# Patient Record
Sex: MALE | Race: OTHER | HISPANIC OR LATINO | URBAN - METROPOLITAN AREA
[De-identification: names, ages, dates, MRNs, and addresses within clinical notes are randomized per-mention and may not be internally consistent; named-entity substitution may affect disease eponyms.]

---

## 2020-10-31 ENCOUNTER — INPATIENT (INPATIENT)
Facility: HOSPITAL | Age: 40
LOS: 11 days | Discharge: HOME | DRG: 815 | End: 2020-11-12
Attending: HOSPITALIST | Admitting: INTERNAL MEDICINE
Payer: MEDICAID

## 2020-10-31 VITALS
RESPIRATION RATE: 18 BRPM | HEART RATE: 94 BPM | DIASTOLIC BLOOD PRESSURE: 91 MMHG | OXYGEN SATURATION: 100 % | SYSTOLIC BLOOD PRESSURE: 152 MMHG | TEMPERATURE: 96 F

## 2020-10-31 DIAGNOSIS — T68.XXXA HYPOTHERMIA, INITIAL ENCOUNTER: ICD-10-CM

## 2020-10-31 LAB
ADD ON TEST-SPECIMEN IN LAB: SIGNIFICANT CHANGE UP
ALBUMIN SERPL ELPH-MCNC: 2.8 G/DL — LOW (ref 3.3–5)
ALBUMIN SERPL ELPH-MCNC: 2.9 G/DL — LOW (ref 3.3–5)
ALBUMIN SERPL ELPH-MCNC: 3.5 G/DL — SIGNIFICANT CHANGE UP (ref 3.3–5)
ALP SERPL-CCNC: 136 U/L — HIGH (ref 40–120)
ALP SERPL-CCNC: 144 U/L — HIGH (ref 40–120)
ALP SERPL-CCNC: 176 U/L — HIGH (ref 40–120)
ALT FLD-CCNC: 63 U/L — SIGNIFICANT CHANGE UP (ref 12–78)
ALT FLD-CCNC: 66 U/L — SIGNIFICANT CHANGE UP (ref 12–78)
ALT FLD-CCNC: 68 U/L — SIGNIFICANT CHANGE UP (ref 12–78)
ANION GAP SERPL CALC-SCNC: 12 MMOL/L — SIGNIFICANT CHANGE UP (ref 5–17)
ANION GAP SERPL CALC-SCNC: 12 MMOL/L — SIGNIFICANT CHANGE UP (ref 5–17)
ANION GAP SERPL CALC-SCNC: 16 MMOL/L — SIGNIFICANT CHANGE UP (ref 5–17)
APAP SERPL-MCNC: < 2 UG/ML (ref 10–30)
APPEARANCE UR: ABNORMAL
AST SERPL-CCNC: 230 U/L — HIGH (ref 15–37)
AST SERPL-CCNC: 235 U/L — HIGH (ref 15–37)
AST SERPL-CCNC: 245 U/L — HIGH (ref 15–37)
BASOPHILS # BLD AUTO: 0.06 K/UL — SIGNIFICANT CHANGE UP (ref 0–0.2)
BASOPHILS NFR BLD AUTO: 2.1 % — HIGH (ref 0–2)
BILIRUB DIRECT SERPL-MCNC: 0.3 MG/DL — HIGH (ref 0–0.2)
BILIRUB INDIRECT FLD-MCNC: 0.5 MG/DL — SIGNIFICANT CHANGE UP (ref 0.2–1)
BILIRUB SERPL-MCNC: 0.7 MG/DL — SIGNIFICANT CHANGE UP (ref 0.2–1.2)
BILIRUB SERPL-MCNC: 0.7 MG/DL — SIGNIFICANT CHANGE UP (ref 0.2–1.2)
BILIRUB SERPL-MCNC: 0.8 MG/DL — SIGNIFICANT CHANGE UP (ref 0.2–1.2)
BILIRUB UR-MCNC: NEGATIVE — SIGNIFICANT CHANGE UP
BUN SERPL-MCNC: 10 MG/DL — SIGNIFICANT CHANGE UP (ref 7–23)
BUN SERPL-MCNC: 11 MG/DL — SIGNIFICANT CHANGE UP (ref 7–23)
BUN SERPL-MCNC: 8 MG/DL — SIGNIFICANT CHANGE UP (ref 7–23)
CALCIUM SERPL-MCNC: 7.2 MG/DL — LOW (ref 8.5–10.1)
CALCIUM SERPL-MCNC: 7.7 MG/DL — LOW (ref 8.5–10.1)
CALCIUM SERPL-MCNC: 8.6 MG/DL — SIGNIFICANT CHANGE UP (ref 8.5–10.1)
CHLORIDE SERPL-SCNC: 103 MMOL/L — SIGNIFICANT CHANGE UP (ref 96–108)
CHLORIDE SERPL-SCNC: 105 MMOL/L — SIGNIFICANT CHANGE UP (ref 96–108)
CHLORIDE SERPL-SCNC: 107 MMOL/L — SIGNIFICANT CHANGE UP (ref 96–108)
CK SERPL-CCNC: 948 U/L — HIGH (ref 26–308)
CO2 SERPL-SCNC: 24 MMOL/L — SIGNIFICANT CHANGE UP (ref 22–31)
CO2 SERPL-SCNC: 25 MMOL/L — SIGNIFICANT CHANGE UP (ref 22–31)
CO2 SERPL-SCNC: 26 MMOL/L — SIGNIFICANT CHANGE UP (ref 22–31)
COLOR SPEC: ABNORMAL
CREAT SERPL-MCNC: 0.36 MG/DL — LOW (ref 0.5–1.3)
CREAT SERPL-MCNC: 0.45 MG/DL — LOW (ref 0.5–1.3)
CREAT SERPL-MCNC: 0.5 MG/DL — SIGNIFICANT CHANGE UP (ref 0.5–1.3)
DIFF PNL FLD: ABNORMAL
EOSINOPHIL # BLD AUTO: 0 K/UL — SIGNIFICANT CHANGE UP (ref 0–0.5)
EOSINOPHIL NFR BLD AUTO: 0 % — SIGNIFICANT CHANGE UP (ref 0–6)
ETHANOL SERPL-MCNC: 321 MG/DL — HIGH (ref 0–10)
GLUCOSE SERPL-MCNC: 44 MG/DL — CRITICAL LOW (ref 70–99)
GLUCOSE SERPL-MCNC: 69 MG/DL — LOW (ref 70–99)
GLUCOSE SERPL-MCNC: 86 MG/DL — SIGNIFICANT CHANGE UP (ref 70–99)
GLUCOSE UR QL: NEGATIVE MG/DL — SIGNIFICANT CHANGE UP
HCT VFR BLD CALC: 44 % — SIGNIFICANT CHANGE UP (ref 39–50)
HGB BLD-MCNC: 14.4 G/DL — SIGNIFICANT CHANGE UP (ref 13–17)
IMM GRANULOCYTES NFR BLD AUTO: 0 % — SIGNIFICANT CHANGE UP (ref 0–1.5)
KETONES UR-MCNC: ABNORMAL
LEUKOCYTE ESTERASE UR-ACNC: NEGATIVE — SIGNIFICANT CHANGE UP
LYMPHOCYTES # BLD AUTO: 0.48 K/UL — LOW (ref 1–3.3)
LYMPHOCYTES # BLD AUTO: 16.8 % — SIGNIFICANT CHANGE UP (ref 13–44)
MAGNESIUM SERPL-MCNC: 1.1 MG/DL — LOW (ref 1.6–2.6)
MAGNESIUM SERPL-MCNC: 1.1 MG/DL — LOW (ref 1.6–2.6)
MAGNESIUM SERPL-MCNC: 1.7 MG/DL — SIGNIFICANT CHANGE UP (ref 1.6–2.6)
MCHC RBC-ENTMCNC: 32.7 GM/DL — SIGNIFICANT CHANGE UP (ref 32–36)
MCHC RBC-ENTMCNC: 33 PG — SIGNIFICANT CHANGE UP (ref 27–34)
MCV RBC AUTO: 100.9 FL — HIGH (ref 80–100)
MONOCYTES # BLD AUTO: 0.1 K/UL — SIGNIFICANT CHANGE UP (ref 0–0.9)
MONOCYTES NFR BLD AUTO: 3.5 % — SIGNIFICANT CHANGE UP (ref 2–14)
NEUTROPHILS # BLD AUTO: 2.22 K/UL — SIGNIFICANT CHANGE UP (ref 1.8–7.4)
NEUTROPHILS NFR BLD AUTO: 77.6 % — HIGH (ref 43–77)
NITRITE UR-MCNC: NEGATIVE — SIGNIFICANT CHANGE UP
PCP SPEC-MCNC: SIGNIFICANT CHANGE UP
PH UR: 6 — SIGNIFICANT CHANGE UP (ref 5–8)
PHOSPHATE SERPL-MCNC: 2.4 MG/DL — LOW (ref 2.5–4.5)
PHOSPHATE SERPL-MCNC: 4.8 MG/DL — HIGH (ref 2.5–4.5)
PLATELET # BLD AUTO: 155 K/UL — SIGNIFICANT CHANGE UP (ref 150–400)
POTASSIUM SERPL-MCNC: 3.2 MMOL/L — LOW (ref 3.5–5.3)
POTASSIUM SERPL-MCNC: 3.3 MMOL/L — LOW (ref 3.5–5.3)
POTASSIUM SERPL-MCNC: 4.3 MMOL/L — SIGNIFICANT CHANGE UP (ref 3.5–5.3)
POTASSIUM SERPL-SCNC: 3.2 MMOL/L — LOW (ref 3.5–5.3)
POTASSIUM SERPL-SCNC: 3.3 MMOL/L — LOW (ref 3.5–5.3)
POTASSIUM SERPL-SCNC: 4.3 MMOL/L — SIGNIFICANT CHANGE UP (ref 3.5–5.3)
PROT SERPL-MCNC: 6.3 GM/DL — SIGNIFICANT CHANGE UP (ref 6–8.3)
PROT SERPL-MCNC: 6.3 GM/DL — SIGNIFICANT CHANGE UP (ref 6–8.3)
PROT SERPL-MCNC: 8.1 GM/DL — SIGNIFICANT CHANGE UP (ref 6–8.3)
PROT UR-MCNC: 500 MG/DL
RBC # BLD: 4.36 M/UL — SIGNIFICANT CHANGE UP (ref 4.2–5.8)
RBC # FLD: 12.8 % — SIGNIFICANT CHANGE UP (ref 10.3–14.5)
SALICYLATES SERPL-MCNC: <1.7 MG/DL — LOW (ref 2.8–20)
SARS-COV-2 RNA SPEC QL NAA+PROBE: SIGNIFICANT CHANGE UP
SODIUM SERPL-SCNC: 141 MMOL/L — SIGNIFICANT CHANGE UP (ref 135–145)
SODIUM SERPL-SCNC: 142 MMOL/L — SIGNIFICANT CHANGE UP (ref 135–145)
SODIUM SERPL-SCNC: 147 MMOL/L — HIGH (ref 135–145)
SP GR SPEC: 1.02 — SIGNIFICANT CHANGE UP (ref 1.01–1.02)
TROPONIN I SERPL-MCNC: <0.015 NG/ML — SIGNIFICANT CHANGE UP (ref 0.01–0.04)
TROPONIN I SERPL-MCNC: <0.015 NG/ML — SIGNIFICANT CHANGE UP (ref 0.01–0.04)
UROBILINOGEN FLD QL: NEGATIVE MG/DL — SIGNIFICANT CHANGE UP
WBC # BLD: 2.86 K/UL — LOW (ref 3.8–10.5)
WBC # FLD AUTO: 2.86 K/UL — LOW (ref 3.8–10.5)

## 2020-10-31 PROCEDURE — 80076 HEPATIC FUNCTION PANEL: CPT

## 2020-10-31 PROCEDURE — 82550 ASSAY OF CK (CPK): CPT

## 2020-10-31 PROCEDURE — 36415 COLL VENOUS BLD VENIPUNCTURE: CPT

## 2020-10-31 PROCEDURE — 85027 COMPLETE CBC AUTOMATED: CPT

## 2020-10-31 PROCEDURE — 93010 ELECTROCARDIOGRAM REPORT: CPT

## 2020-10-31 PROCEDURE — 83735 ASSAY OF MAGNESIUM: CPT

## 2020-10-31 PROCEDURE — 86803 HEPATITIS C AB TEST: CPT

## 2020-10-31 PROCEDURE — 93005 ELECTROCARDIOGRAM TRACING: CPT

## 2020-10-31 PROCEDURE — 71045 X-RAY EXAM CHEST 1 VIEW: CPT | Mod: 26

## 2020-10-31 PROCEDURE — 80053 COMPREHEN METABOLIC PANEL: CPT

## 2020-10-31 PROCEDURE — 84100 ASSAY OF PHOSPHORUS: CPT

## 2020-10-31 PROCEDURE — 70450 CT HEAD/BRAIN W/O DYE: CPT | Mod: 26

## 2020-10-31 PROCEDURE — 80048 BASIC METABOLIC PNL TOTAL CA: CPT

## 2020-10-31 PROCEDURE — 84484 ASSAY OF TROPONIN QUANT: CPT

## 2020-10-31 PROCEDURE — 82962 GLUCOSE BLOOD TEST: CPT

## 2020-10-31 PROCEDURE — 83605 ASSAY OF LACTIC ACID: CPT

## 2020-10-31 PROCEDURE — 81001 URINALYSIS AUTO W/SCOPE: CPT

## 2020-10-31 PROCEDURE — 87040 BLOOD CULTURE FOR BACTERIA: CPT

## 2020-10-31 RX ORDER — DEXTROSE 50 % IN WATER 50 %
50 SYRINGE (ML) INTRAVENOUS ONCE
Refills: 0 | Status: COMPLETED | OUTPATIENT
Start: 2020-10-31 | End: 2020-10-31

## 2020-10-31 RX ORDER — MAGNESIUM SULFATE 500 MG/ML
2 VIAL (ML) INJECTION ONCE
Refills: 0 | Status: COMPLETED | OUTPATIENT
Start: 2020-10-31 | End: 2020-10-31

## 2020-10-31 RX ORDER — SODIUM CHLORIDE 9 MG/ML
1000 INJECTION INTRAMUSCULAR; INTRAVENOUS; SUBCUTANEOUS ONCE
Refills: 0 | Status: COMPLETED | OUTPATIENT
Start: 2020-10-31 | End: 2020-10-31

## 2020-10-31 RX ORDER — SODIUM CHLORIDE 9 MG/ML
1000 INJECTION, SOLUTION INTRAVENOUS
Refills: 0 | Status: DISCONTINUED | OUTPATIENT
Start: 2020-10-31 | End: 2020-10-31

## 2020-10-31 RX ORDER — POTASSIUM CHLORIDE 20 MEQ
10 PACKET (EA) ORAL
Refills: 0 | Status: COMPLETED | OUTPATIENT
Start: 2020-10-31 | End: 2020-10-31

## 2020-10-31 RX ORDER — MAGNESIUM SULFATE 500 MG/ML
2 VIAL (ML) INJECTION ONCE
Refills: 0 | Status: DISCONTINUED | OUTPATIENT
Start: 2020-10-31 | End: 2020-10-31

## 2020-10-31 RX ORDER — FOLIC ACID 0.8 MG
1 TABLET ORAL DAILY
Refills: 0 | Status: DISCONTINUED | OUTPATIENT
Start: 2020-10-31 | End: 2020-11-12

## 2020-10-31 RX ORDER — THIAMINE MONONITRATE (VIT B1) 100 MG
100 TABLET ORAL DAILY
Refills: 0 | Status: DISCONTINUED | OUTPATIENT
Start: 2020-10-31 | End: 2020-11-12

## 2020-10-31 RX ORDER — CHLORHEXIDINE GLUCONATE 213 G/1000ML
1 SOLUTION TOPICAL
Refills: 0 | Status: DISCONTINUED | OUTPATIENT
Start: 2020-10-31 | End: 2020-11-12

## 2020-10-31 RX ORDER — SODIUM CHLORIDE 9 MG/ML
1000 INJECTION, SOLUTION INTRAVENOUS
Refills: 0 | Status: DISCONTINUED | OUTPATIENT
Start: 2020-10-31 | End: 2020-11-02

## 2020-10-31 RX ADMIN — Medication 100 MILLIEQUIVALENT(S): at 17:10

## 2020-10-31 RX ADMIN — Medication 100 MILLIEQUIVALENT(S): at 16:07

## 2020-10-31 RX ADMIN — Medication 2 MILLIGRAM(S): at 11:28

## 2020-10-31 RX ADMIN — SODIUM CHLORIDE 125 MILLILITER(S): 9 INJECTION, SOLUTION INTRAVENOUS at 11:25

## 2020-10-31 RX ADMIN — Medication 50 MILLILITER(S): at 17:49

## 2020-10-31 RX ADMIN — Medication 4 MILLIGRAM(S): at 22:30

## 2020-10-31 RX ADMIN — Medication 2 MILLIGRAM(S): at 14:04

## 2020-10-31 RX ADMIN — Medication 50 MILLILITER(S): at 15:05

## 2020-10-31 RX ADMIN — SODIUM CHLORIDE 1000 MILLILITER(S): 9 INJECTION INTRAMUSCULAR; INTRAVENOUS; SUBCUTANEOUS at 09:03

## 2020-10-31 RX ADMIN — Medication 1 TABLET(S): at 11:31

## 2020-10-31 RX ADMIN — SODIUM CHLORIDE 2000 MILLILITER(S): 9 INJECTION INTRAMUSCULAR; INTRAVENOUS; SUBCUTANEOUS at 09:29

## 2020-10-31 RX ADMIN — Medication 2 MILLIGRAM(S): at 17:14

## 2020-10-31 RX ADMIN — Medication 4 MILLIGRAM(S): at 19:10

## 2020-10-31 RX ADMIN — SODIUM CHLORIDE 1000 MILLILITER(S): 9 INJECTION INTRAMUSCULAR; INTRAVENOUS; SUBCUTANEOUS at 08:03

## 2020-10-31 RX ADMIN — Medication 1 MILLIGRAM(S): at 11:31

## 2020-10-31 RX ADMIN — Medication 100 MILLIGRAM(S): at 11:31

## 2020-10-31 RX ADMIN — Medication 2 MILLIGRAM(S): at 08:03

## 2020-10-31 RX ADMIN — SODIUM CHLORIDE 1000 MILLILITER(S): 9 INJECTION INTRAMUSCULAR; INTRAVENOUS; SUBCUTANEOUS at 07:49

## 2020-10-31 RX ADMIN — SODIUM CHLORIDE 1000 MILLILITER(S): 9 INJECTION INTRAMUSCULAR; INTRAVENOUS; SUBCUTANEOUS at 06:49

## 2020-10-31 RX ADMIN — Medication 100 MILLIEQUIVALENT(S): at 18:03

## 2020-10-31 RX ADMIN — Medication 50 GRAM(S): at 16:07

## 2020-10-31 RX ADMIN — SODIUM CHLORIDE 100 MILLILITER(S): 9 INJECTION, SOLUTION INTRAVENOUS at 17:53

## 2020-10-31 NOTE — ED ADULT NURSE REASSESSMENT NOTE - NS ED NURSE REASSESS COMMENT FT1
received pt asleep at times, tremors, responding to questions, able to make needs known, no acute respiratory distress, alcohol on breath noted, restlessness in bed, transferred to ct scan and return to ER safely, increased agitation noted, MD Sprague made aware, in to evaluate pt, administered ativan as ordered with positive results, abdomen softly distended, cornelius to bedside drainage draining jennifer urine.

## 2020-10-31 NOTE — ED ADULT NURSE REASSESSMENT NOTE - NS ED NURSE REASSESS COMMENT FT1
pts rectal temp would not read with probe. temp sensing cornelius placed reading 83 degrees. MD Mcclain made aware. warm IVF infusing, hypothermia blanket applied.

## 2020-10-31 NOTE — H&P ADULT - ASSESSMENT
LIGIA VERDUGO is a ~57 yo Malian speaking male with presumed hx of ETOH abuse admitted with     1. Hypothermia 2/2 environmental exposure   2. ETOH withdrawal   3. Hypotension/Shock     NEURO: ETOH withdrawal, CIWA ativan taper and prn ordered.   CV: Hypotension bordering shock 2/2 rewarming vasodilation.  Aggressive crystalloid resuscitation as tolerated, continue to warm, q6hr bmps, close lyte and cardiac monitoring.  Goal mag >2, k >4.  RESP: Aspiration precautions, keep hob >30 degrees.   RENAL: Monitor lytes, replace as needed.    GI: NPO except meds  ENDO: POCT q6hrs  ID: No active infectious process  HEME: Lovenox for VTE ppx   DISPO: Full code    Critical Care time: 40 mins assessing presenting problems of acute illness that poses high probability of life threatening deterioration or end organ damage/dysfunction.  Medical decision making including Initiating plan of care, reviewing data, reviewing radiology, discussing with multidisciplinary team, non inclusive of procedures, discussing goals of care with patient/family LIGIA VERDUGO is a ~59 yo Fijian speaking male with presumed hx of ETOH abuse admitted with     1. Hypothermia 2/2 environmental exposure   2. ETOH withdrawal   3. Hypotension/Shock   4. Rhabdo    NEURO: ETOH withdrawal, CIWA ativan taper and prn ordered.   CV: Hypotension bordering shock 2/2 rewarming vasodilation.  Aggressive crystalloid resuscitation as tolerated, continue to warm, q6hr bmps, close lyte and cardiac monitoring.  Goal mag >2, k >4.  RESP: Aspiration precautions, keep hob >30 degrees.   RENAL: Monitor lytes, replace as needed.  Rhabdo, aggressive crystalloid resusication.  GI: NPO except meds  ENDO: POCT q6hrs  ID: No active infectious process  HEME: Lovenox for VTE ppx   DISPO: Full code    Critical Care time: 40 mins assessing presenting problems of acute illness that poses high probability of life threatening deterioration or end organ damage/dysfunction.  Medical decision making including Initiating plan of care, reviewing data, reviewing radiology, discussing with multidisciplinary team, non inclusive of procedures, discussing goals of care with patient/family

## 2020-10-31 NOTE — ED PROVIDER NOTE - OBJECTIVE STATEMENT
57 y/o M with unknown PMHx BIBEMS after he was found outside laying on the ground with alcohol on breath and somnolent.  He is noted to be very cold to the touch.  Pt is unable to provide HPI or ROS with : [id number].  Pt's rectal temp was undetectable at triage.

## 2020-10-31 NOTE — H&P ADULT - NSHPPHYSICALEXAM_GEN_ALL_CORE
GENERAL: Middle aged male, lying in bed, periods of agitation.   HEENT: NC/AT, Pupils 3mm, equal, round and reactive   CV: Sinus rhythm, no murmurs, rubs or gallops appreciated.   RESP: Symmetrical thorax expansion upon respiration.  Clear b/l, +snoring  ABD: soft, nontender, nondistended, normoactive bowel sounds, no masses appreciated   : cornelius in place   EXT: No edema, nontender  SKIN: Warming, no rashes appreciated  NEURO: Lethargic, does not answer questions appropriately, asking for a drink, periods of agitation.

## 2020-10-31 NOTE — ED ADULT NURSE NOTE - NSIMPLEMENTINTERV_GEN_ALL_ED
Implemented All Fall with Harm Risk Interventions:  [place] to call system. Call bell, personal items and telephone within reach. Instruct patient to call for assistance. Room bathroom lighting operational. Non-slip footwear when patient is off stretcher. Physically safe environment: no spills, clutter or unnecessary equipment. Stretcher in lowest position, wheels locked, appropriate side rails in place. Provide visual cue, wrist band, yellow gown, etc. Monitor gait and stability. Monitor for mental status changes and reorient to person, place, and time. Review medications for side effects contributing to fall risk. Reinforce activity limits and safety measures with patient and family. Provide visual clues: red socks.

## 2020-10-31 NOTE — ED ADULT NURSE REASSESSMENT NOTE - NS ED NURSE REASSESS COMMENT FT1
no acute respiratory distress, no tremors noted currently, asleep at most times, when aroused becomes agitated, needs reorientation to surroundings with positive results.

## 2020-10-31 NOTE — H&P ADULT - HISTORY OF PRESENT ILLNESS
LIGIA VERDUGO is a ~59 yo Tajik speaking male with presumed hx of ETOH abuse found down outside and brought to hospital. Upon presentation, patient was cool to touch, with core temps reading low 80s.  Initially BP was stable 140s-150s, HR in the 60s, confused and unable to answer questions.  Patient given 2L warm IVF and placed on a warming blanket.  Labs significant for WBC 2.86, Ck 1089, and alcohol level 321.  Unknown amount of alcohol ingested and how long he was outside for.  Initial core temp reading ~82F, as patient warming now 89F patient's bp dropping, current BP in the 70s.  Additional crystalloid bolus ordered with maintenance therapy.  Patient exhibiting signs of alcohol withdrawal. HHCRITICAL, LIGIA is a ~57 yo Greenlandic speaking male endorses his name is Dashawn Wills, doesn't know ) with presumed hx of ETOH abuse found down outside and brought to hospital. Upon presentation, patient was cool to touch, with core temps reading low 80s.  Initially BP was stable 140s-150s, HR in the 60s, confused and unable to answer questions.  Patient given 2L warm IVF and placed on a warming blanket.  Labs significant for WBC 2.86, Ck 1089, and alcohol level 321.  Unknown amount of alcohol ingested and how long he was outside for.  Initial core temp reading ~82F, as patient warming now 89F patient's bp dropping, current BP in the 70s.  Additional crystalloid bolus ordered with maintenance therapy.  Patient exhibiting signs of alcohol withdrawal.

## 2020-10-31 NOTE — ED ADULT NURSE NOTE - OBJECTIVE STATEMENT
pt arrives to ED brought in by EMS s/p ETOH intoxication. pt was found on street laying down when someone called EMS. pt complains of "being cold." AOB. pt refusing to answer questions.

## 2020-10-31 NOTE — ED ADULT TRIAGE NOTE - CHIEF COMPLAINT QUOTE
BIBEMS from street, unknown full name/birthday, shivering because it is very cold outside. Likely ETOH intoxication. Patient not answering questions at this time, he is "too cold." Multiple warm blankets provided.

## 2020-10-31 NOTE — ED PROVIDER NOTE - CARE PLAN
Principal Discharge DX:	Hypothermia, initial encounter  Secondary Diagnosis:	Acute alcoholic intoxication with complication

## 2020-10-31 NOTE — ED PROVIDER NOTE - CLINICAL SUMMARY MEDICAL DECISION MAKING FREE TEXT BOX
Pt p/w acute alcohol intoxication and profound hypothermia.  Pt is hemodynamically stable.  Plan: Rewarming with Nyla Hugger, warm IV fluids, labs, CXR, to r/o sepsis, admit

## 2020-10-31 NOTE — H&P ADULT - NSHPSOCIALHISTORY_GEN_ALL_CORE
Presumed ETOH abuse ETOH abuse, when asked his response is "all the alcohol". Denies smoking.  Asked if he takes medicine at home and he laughs and says "my alcohol"

## 2020-11-01 LAB
ALBUMIN SERPL ELPH-MCNC: 3 G/DL — LOW (ref 3.3–5)
ALP SERPL-CCNC: 155 U/L — HIGH (ref 40–120)
ALT FLD-CCNC: 60 U/L — SIGNIFICANT CHANGE UP (ref 12–78)
ANION GAP SERPL CALC-SCNC: 7 MMOL/L — SIGNIFICANT CHANGE UP (ref 5–17)
ANION GAP SERPL CALC-SCNC: 8 MMOL/L — SIGNIFICANT CHANGE UP (ref 5–17)
AST SERPL-CCNC: 211 U/L — HIGH (ref 15–37)
BILIRUB SERPL-MCNC: 1 MG/DL — SIGNIFICANT CHANGE UP (ref 0.2–1.2)
BUN SERPL-MCNC: 7 MG/DL — SIGNIFICANT CHANGE UP (ref 7–23)
BUN SERPL-MCNC: 7 MG/DL — SIGNIFICANT CHANGE UP (ref 7–23)
CALCIUM SERPL-MCNC: 8 MG/DL — LOW (ref 8.5–10.1)
CALCIUM SERPL-MCNC: 8.5 MG/DL — SIGNIFICANT CHANGE UP (ref 8.5–10.1)
CHLORIDE SERPL-SCNC: 101 MMOL/L — SIGNIFICANT CHANGE UP (ref 96–108)
CHLORIDE SERPL-SCNC: 98 MMOL/L — SIGNIFICANT CHANGE UP (ref 96–108)
CK SERPL-CCNC: 868 U/L — HIGH (ref 26–308)
CO2 SERPL-SCNC: 31 MMOL/L — SIGNIFICANT CHANGE UP (ref 22–31)
CO2 SERPL-SCNC: 32 MMOL/L — HIGH (ref 22–31)
CREAT SERPL-MCNC: 0.51 MG/DL — SIGNIFICANT CHANGE UP (ref 0.5–1.3)
CREAT SERPL-MCNC: 0.64 MG/DL — SIGNIFICANT CHANGE UP (ref 0.5–1.3)
GLUCOSE SERPL-MCNC: 86 MG/DL — SIGNIFICANT CHANGE UP (ref 70–99)
GLUCOSE SERPL-MCNC: 97 MG/DL — SIGNIFICANT CHANGE UP (ref 70–99)
HCT VFR BLD CALC: 37 % — LOW (ref 39–50)
HCV AB S/CO SERPL IA: 0.26 S/CO — SIGNIFICANT CHANGE UP (ref 0–0.99)
HCV AB SERPL-IMP: SIGNIFICANT CHANGE UP
HGB BLD-MCNC: 12.3 G/DL — LOW (ref 13–17)
MAGNESIUM SERPL-MCNC: 1.2 MG/DL — LOW (ref 1.6–2.6)
MAGNESIUM SERPL-MCNC: 2.1 MG/DL — SIGNIFICANT CHANGE UP (ref 1.6–2.6)
MCHC RBC-ENTMCNC: 33.1 PG — SIGNIFICANT CHANGE UP (ref 27–34)
MCHC RBC-ENTMCNC: 33.2 GM/DL — SIGNIFICANT CHANGE UP (ref 32–36)
MCV RBC AUTO: 99.5 FL — SIGNIFICANT CHANGE UP (ref 80–100)
PHOSPHATE SERPL-MCNC: 2.4 MG/DL — LOW (ref 2.5–4.5)
PHOSPHATE SERPL-MCNC: 2.7 MG/DL — SIGNIFICANT CHANGE UP (ref 2.5–4.5)
PLATELET # BLD AUTO: 108 K/UL — LOW (ref 150–400)
POTASSIUM SERPL-MCNC: 2.8 MMOL/L — CRITICAL LOW (ref 3.5–5.3)
POTASSIUM SERPL-MCNC: 3.3 MMOL/L — LOW (ref 3.5–5.3)
POTASSIUM SERPL-SCNC: 2.8 MMOL/L — CRITICAL LOW (ref 3.5–5.3)
POTASSIUM SERPL-SCNC: 3.3 MMOL/L — LOW (ref 3.5–5.3)
PROT SERPL-MCNC: 6.8 GM/DL — SIGNIFICANT CHANGE UP (ref 6–8.3)
RBC # BLD: 3.72 M/UL — LOW (ref 4.2–5.8)
RBC # FLD: 13.1 % — SIGNIFICANT CHANGE UP (ref 10.3–14.5)
SODIUM SERPL-SCNC: 138 MMOL/L — SIGNIFICANT CHANGE UP (ref 135–145)
SODIUM SERPL-SCNC: 139 MMOL/L — SIGNIFICANT CHANGE UP (ref 135–145)
WBC # BLD: 3.46 K/UL — LOW (ref 3.8–10.5)
WBC # FLD AUTO: 3.46 K/UL — LOW (ref 3.8–10.5)

## 2020-11-01 RX ORDER — PIPERACILLIN AND TAZOBACTAM 4; .5 G/20ML; G/20ML
3.38 INJECTION, POWDER, LYOPHILIZED, FOR SOLUTION INTRAVENOUS ONCE
Refills: 0 | Status: COMPLETED | OUTPATIENT
Start: 2020-11-01 | End: 2020-11-01

## 2020-11-01 RX ORDER — PIPERACILLIN AND TAZOBACTAM 4; .5 G/20ML; G/20ML
3.38 INJECTION, POWDER, LYOPHILIZED, FOR SOLUTION INTRAVENOUS EVERY 8 HOURS
Refills: 0 | Status: DISCONTINUED | OUTPATIENT
Start: 2020-11-01 | End: 2020-11-06

## 2020-11-01 RX ORDER — ACETAMINOPHEN 500 MG
650 TABLET ORAL EVERY 6 HOURS
Refills: 0 | Status: DISCONTINUED | OUTPATIENT
Start: 2020-11-01 | End: 2020-11-12

## 2020-11-01 RX ORDER — ENOXAPARIN SODIUM 100 MG/ML
40 INJECTION SUBCUTANEOUS DAILY
Refills: 0 | Status: DISCONTINUED | OUTPATIENT
Start: 2020-11-01 | End: 2020-11-12

## 2020-11-01 RX ORDER — MAGNESIUM SULFATE 500 MG/ML
4 VIAL (ML) INJECTION ONCE
Refills: 0 | Status: COMPLETED | OUTPATIENT
Start: 2020-11-01 | End: 2020-11-01

## 2020-11-01 RX ORDER — SODIUM,POTASSIUM PHOSPHATES 278-250MG
1 POWDER IN PACKET (EA) ORAL ONCE
Refills: 0 | Status: COMPLETED | OUTPATIENT
Start: 2020-11-01 | End: 2020-11-01

## 2020-11-01 RX ORDER — DEXMEDETOMIDINE HYDROCHLORIDE IN 0.9% SODIUM CHLORIDE 4 UG/ML
0.5 INJECTION INTRAVENOUS
Qty: 200 | Refills: 0 | Status: DISCONTINUED | OUTPATIENT
Start: 2020-11-01 | End: 2020-11-06

## 2020-11-01 RX ORDER — POTASSIUM CHLORIDE 20 MEQ
40 PACKET (EA) ORAL
Refills: 0 | Status: COMPLETED | OUTPATIENT
Start: 2020-11-01 | End: 2020-11-01

## 2020-11-01 RX ORDER — POTASSIUM CHLORIDE 20 MEQ
40 PACKET (EA) ORAL EVERY 4 HOURS
Refills: 0 | Status: COMPLETED | OUTPATIENT
Start: 2020-11-01 | End: 2020-11-01

## 2020-11-01 RX ADMIN — Medication 1 TABLET(S): at 09:04

## 2020-11-01 RX ADMIN — Medication 2 MILLIGRAM(S): at 22:54

## 2020-11-01 RX ADMIN — Medication 3 MILLIGRAM(S): at 21:28

## 2020-11-01 RX ADMIN — PIPERACILLIN AND TAZOBACTAM 25 GRAM(S): 4; .5 INJECTION, POWDER, LYOPHILIZED, FOR SOLUTION INTRAVENOUS at 21:28

## 2020-11-01 RX ADMIN — Medication 1 TABLET(S): at 12:21

## 2020-11-01 RX ADMIN — Medication 40 MILLIEQUIVALENT(S): at 09:04

## 2020-11-01 RX ADMIN — PIPERACILLIN AND TAZOBACTAM 200 GRAM(S): 4; .5 INJECTION, POWDER, LYOPHILIZED, FOR SOLUTION INTRAVENOUS at 08:54

## 2020-11-01 RX ADMIN — Medication 1 MILLIGRAM(S): at 09:04

## 2020-11-01 RX ADMIN — Medication 4 MILLIGRAM(S): at 02:06

## 2020-11-01 RX ADMIN — Medication 40 MILLIEQUIVALENT(S): at 18:29

## 2020-11-01 RX ADMIN — Medication 4 MILLIGRAM(S): at 13:02

## 2020-11-01 RX ADMIN — ENOXAPARIN SODIUM 40 MILLIGRAM(S): 100 INJECTION SUBCUTANEOUS at 12:20

## 2020-11-01 RX ADMIN — Medication 4 MILLIGRAM(S): at 09:20

## 2020-11-01 RX ADMIN — CHLORHEXIDINE GLUCONATE 1 APPLICATION(S): 213 SOLUTION TOPICAL at 08:53

## 2020-11-01 RX ADMIN — Medication 4 MILLIGRAM(S): at 06:29

## 2020-11-01 RX ADMIN — Medication 100 MILLIGRAM(S): at 09:04

## 2020-11-01 RX ADMIN — Medication 650 MILLIGRAM(S): at 09:57

## 2020-11-01 RX ADMIN — Medication 100 GRAM(S): at 08:54

## 2020-11-01 RX ADMIN — DEXMEDETOMIDINE HYDROCHLORIDE IN 0.9% SODIUM CHLORIDE 9.38 MICROGRAM(S)/KG/HR: 4 INJECTION INTRAVENOUS at 23:30

## 2020-11-01 RX ADMIN — Medication 40 MILLIEQUIVALENT(S): at 10:53

## 2020-11-01 RX ADMIN — Medication 3 MILLIGRAM(S): at 17:23

## 2020-11-01 RX ADMIN — Medication 40 MILLIEQUIVALENT(S): at 21:28

## 2020-11-01 RX ADMIN — Medication 2 MILLIGRAM(S): at 23:33

## 2020-11-01 RX ADMIN — PIPERACILLIN AND TAZOBACTAM 25 GRAM(S): 4; .5 INJECTION, POWDER, LYOPHILIZED, FOR SOLUTION INTRAVENOUS at 14:11

## 2020-11-01 NOTE — DIETITIAN INITIAL EVALUATION ADULT. - OTHER INFO
LIGIA GRIFFITHS is a ~57 yo Tamazight speaking male endorses his name is Dashawn Wills, doesn't know ) with presumed hx of ETOH abuse found down outside and brought to hospital. Upon presentation, patient was cool to touch, with core temps reading low 80s.  Initially BP was stable 140s-150s, HR in the 60s, confused and unable to answer questions.  Patient given 2L warm IVF and placed on a warming blanket.  Labs significant for WBC 2.86, Ck 1089, and alcohol level 321.  Unknown amount of alcohol ingested and how long he was outside for.  Initial core temp reading ~82F, as patient warming now 89F patient's bp dropping, current BP in the 70s.  Additional crystalloid bolus ordered with maintenance therapy.  Patient exhibiting signs of alcohol withdrawal.      Pt seen for assessment. Pt with breakfast and appeared to be eating. Pt is noted with poor orientation, unable to provide detailed diet information. Pt is noted to be found with elevated ETOH and found outside. no weight or height information in chart but pt appear nourished, may have acute decrease in PO intake recently. No noted c/s difficulty. No noted n/v/d/c. Pt's labs reviewed and K and Po4 noted low. Recommend Repletion, Add Ensure Enlive Daily to help meet needs. Will continue to monitor.

## 2020-11-01 NOTE — DIETITIAN INITIAL EVALUATION ADULT. - PERTINENT MEDS FT
MEDICATIONS  (STANDING):  chlorhexidine 4% Liquid 1 Application(s) Topical <User Schedule>  dextrose 5% + lactated ringers. 1000 milliLiter(s) (100 mL/Hr) IV Continuous <Continuous>  enoxaparin Injectable 40 milliGRAM(s) SubCutaneous daily  folic acid 1 milliGRAM(s) Oral daily  LORazepam   Injectable   IV Push   LORazepam   Injectable 3 milliGRAM(s) IV Push every 4 hours  multivitamin 1 Tablet(s) Oral daily  piperacillin/tazobactam IVPB.. 3.375 Gram(s) IV Intermittent every 8 hours  thiamine 100 milliGRAM(s) Oral daily    MEDICATIONS  (PRN):  acetaminophen   Tablet .. 650 milliGRAM(s) Oral every 6 hours PRN Temp greater or equal to 38C (100.4F)

## 2020-11-01 NOTE — PROGRESS NOTE ADULT - ASSESSMENT
LIGIA VERDUGO is a ~57 yo Slovenian speaking male with presumed hx of ETOH abuse admitted with     1. Hypothermia 2/2 environmental exposure   2. ETOH withdrawal   3. Hypotension/Shock   4. Rhabdo    NEURO: ETOH withdrawal, CIWA ativan taper increased overnight, prn as needed  CV: Hypotension resolved.  Tachycardia in setting of fever and etoh withdrawal.   RESP: Aspiration precautions, keep hob >30 degrees.   RENAL: Monitor lytes, replace as needed.  Rhabdo, aggressive crystalloid resuscitation.   GI: Regular diet.  ENDO: Hypoglycemia, IVF transitioned to D5LR.   ID: Febrile overnight, cultures ordered, empiric zosyn.   HEME: Lovenox for VTE ppx   DISPO: Full code ZAIDALIGIA GALLOWAY is a ~57 yo Wallisian speaking male with presumed hx of ETOH abuse admitted with     1. Hypothermia 2/2 environmental exposure   2. ETOH withdrawal   3. Hypotension/Shock   4. Rhabdo    NEURO: ETOH withdrawal, CIWA ativan taper increased overnight, prn as needed  CV: Hypotension resolved.  Tachycardia in setting of fever and etoh withdrawal.   RESP: Aspiration precautions, keep hob >30 degrees.   RENAL: Monitor lytes, replace as needed.  Rhabdo, aggressive crystalloid resuscitation.   GI: Regular diet.  ENDO: Hypoglycemia, IVF transitioned to D5LR, likely will d/c with patient now on diet.   ID: Febrile overnight, cultures ordered, empiric zosyn.   HEME: Lovenox for VTE ppx   DISPO: Full code

## 2020-11-01 NOTE — DIETITIAN INITIAL EVALUATION ADULT. - PERTINENT LABORATORY DATA
11-01 Na138 mmol/L Glu 86 mg/dL K+ 2.8 mmol/L<LL> Cr  0.51 mg/dL BUN 7 mg/dL Phos 2.4 mg/dL<L> Alb 3.0 g/dL<L> PAB n/a

## 2020-11-01 NOTE — PROGRESS NOTE ADULT - SUBJECTIVE AND OBJECTIVE BOX
Patient is a 58y old  Male who presents with a chief complaint of Hypothermia, shock (31 Oct 2020 09:15)      BRIEF HOSPITAL COURSE:   LIGIA VERDUGO is a ~57 yo Turkmen speaking male endorses his name is Dashawn Wills, doesn't know ) with presumed hx of ETOH abuse found down outside and brought to hospital. Upon presentation, patient was cool to touch, with core temps reading low 80s.  Initially BP was stable 140s-150s, HR in the 60s, confused and unable to answer questions.  Patient given 2L warm IVF and placed on a warming blanket.  Labs significant for WBC 2.86, Ck 1089, and alcohol level 321.  Unknown amount of alcohol ingested and how long he was outside for.  Initial core temp reading ~82F, as patient warming now 89F patient's bp dropping, current BP in the 70s.  Additional crystalloid bolus ordered with maintenance therapy.  Patient exhibiting signs of alcohol withdrawal.     Events last 24 hours:   Febrile overnight.  Cultured this am, starting on empiric zosyn.       PAST MEDICAL & SURGICAL HISTORY:  No pertinent past medical history  No significant past surgical history      Allergies  No Known Allergies      FAMILY HISTORY:  Unknown       Social History:   ETOH abuse       Review of Systems:        Physical Examination:    General: No acute distress.      HEENT: Pupils equal, reactive to light.  Symmetric.    PULM: Clear to auscultation bilaterally, no significant sputum production    CVS: Regular rate and rhythm, no murmurs, rubs, or gallops    ABD: Soft, nondistended, nontender, normoactive bowel sounds, no masses    EXT: No edema, nontender    SKIN: Warm and well perfused, no rashes noted.    NEURO: Alert, oriented, interactive, nonfocal      Medications:  LORazepam   Injectable   IV Push   LORazepam   Injectable 4 milliGRAM(s) IV Push every 4 hours  LORazepam   Injectable 3 milliGRAM(s) IV Push every 4 hours  dextrose 5% + lactated ringers. 1000 milliLiter(s) IV Continuous <Continuous>  folic acid 1 milliGRAM(s) Oral daily  multivitamin 1 Tablet(s) Oral daily  thiamine 100 milliGRAM(s) Oral daily  chlorhexidine 4% Liquid 1 Application(s) Topical <User Schedule>      ICU Vital Signs Last 24 Hrs  T(C): 38 (2020 07:00), Max: 38.8 (31 Oct 2020 23:00)  T(F): 100.4 (2020 07:00), Max: 101.8 (31 Oct 2020 23:00)  HR: 118 (2020 07:00) (59 - 135)  BP: 149/101 (2020 07:00) (80/58 - 149/101)  BP(mean): 111 (2020 07:00) (63 - 127)  ABP: --  ABP(mean): --  RR: 12 (2020 07:00) (10 - 23)  SpO2: 97% (31 Oct 2020 20:00) (89% - 100%)    Vital Signs Last 24 Hrs  T(C): 38 (2020 07:00), Max: 38.8 (31 Oct 2020 23:00)  T(F): 100.4 (2020 07:00), Max: 101.8 (31 Oct 2020 23:00)  HR: 118 (2020 07:00) (59 - 135)  BP: 149/101 (2020 07:00) (80/58 - 149/101)  BP(mean): 111 (2020 07:00) (63 - 127)  RR: 12 (2020 07:00) (10 - 23)  SpO2: 97% (31 Oct 2020 20:00) (89% - 100%)      I&O's Detail    31 Oct 2020 08:01  -  2020 07:00  --------------------------------------------------------  IN:    dextrose 5% + lactated ringers: 1200 mL    IV PiggyBack: 400 mL    Lactated Ringers: 700 mL    Oral Fluid: 250 mL  Total IN: 2550 mL    OUT:    Indwelling Catheter - Urethral (mL): 3475 mL  Total OUT: 3475 mL  Total NET: -925 mL      LABS:                        14.4   2.86  )-----------( 155      ( 31 Oct 2020 06:27 )             44.0     10-31    142  |  105  |  8   ----------------------------<  69<L>  3.3<L>   |  25  |  0.50    Ca    7.7<L>      31 Oct 2020 22:58  Phos  2.4     10  Mg     1.1     10-    TPro  6.3  /  Alb  2.9<L>  /  TBili  0.8  /  DBili  0.3<H>  /  AST  230<H>  /  ALT  63  /  AlkPhos  136<H>  10-31      CARDIAC MARKERS ( 31 Oct 2020 13:27 )  x     / x     / 948 U/L / x     / x      CARDIAC MARKERS ( 31 Oct 2020 08:13 )  <0.015 ng/mL / x     / x     / x     / x      CARDIAC MARKERS ( 31 Oct 2020 06:27 )  <0.015 ng/mL / x     / 1089 U/L / x     / x          CAPILLARY BLOOD GLUCOSE  POCT Blood Glucose.: 95 mg/dL (2020 06:23)      Urinalysis Basic - ( 31 Oct 2020 06:52 )  Color: Kelli / Appearance: Slightly Turbid / S.025 / pH: x  Gluc: x / Ketone: Moderate  / Bili: Negative / Urobili: Negative mg/dL   Blood: x / Protein: 500 mg/dL / Nitrite: Negative   Leuk Esterase: Negative / RBC: 3-5 /HPF / WBC 0-2   Sq Epi: x / Non Sq Epi: Few / Bacteria: Few      CULTURES:  Pending      RADIOLOGY:   < from: CT Head No Cont (10.31.20 @ 07:33) >    EXAM:  CT BRAIN                          PROCEDURE DATE:  10/31/2020    INTERPRETATION:  INDICATION:  Altered mental status  TECHNIQUE:  A non contrast 2.5mm axial CT study of the brain was performed from skull base to vertex. Coronal and sagittal reformations were generated from the axial data.  COMPARISON EXAMINATION:  No prior    FINDINGS:    HEMISPHERES:  There are involutional changes and volume loss, with no evidence of an acute infarct, hemorrhage, or mass. There are very mild scattered chronic ischemic changes.  VENTRICLES:  Midline with ex vacuo enlargement.  POSTERIOR FOSSA:  The brain stem and cerebellum are unremarkable.  No CP angle lesion noted.  EXTRACEREBRAL SPACES:  No subdural or epidural collections are noted.  SKULL BASE AND CALVARIUM:  Appears intact.  No fracture or destructive lesion is identified.  SINUSES AND MASTOIDS:  Clear.  MISCELLANEOUS:  No orbital or suprasellar abnormality noted.    IMPRESSION:  1)  mild volume loss with minimal scattered chronicappearing ischemic changes. No acute abnormality or hemorrhage suggested.  2)  follow-up MR imaging recommended for further assessment.    ULISSES SALAS MD; Attending Radiologist  This document has been electronically signed. Oct 31 2020  8:16AM    < end of copied text >      SUPPLEMENTAL O2: RA  LINES: Peripheral   IVF: D5LR  GARG: N  PPx: PPI  CONTACT: N Patient is a 58y old  Male who presents with a chief complaint of Hypothermia, shock (31 Oct 2020 09:15)      BRIEF HOSPITAL COURSE:   LIGIA VERDUGO is a ~59 yo Greenlandic speaking male endorses his name is Dashawn Wills, doesn't know ) with presumed hx of ETOH abuse found down outside and brought to hospital. Upon presentation, patient was cool to touch, with core temps reading low 80s.  Initially BP was stable 140s-150s, HR in the 60s, confused and unable to answer questions.  Patient given 2L warm IVF and placed on a warming blanket.  Labs significant for WBC 2.86, Ck 1089, and alcohol level 321.  Unknown amount of alcohol ingested and how long he was outside for.  Initial core temp reading ~82F, as patient warming now 89F patient's bp dropping, current BP in the 70s.  Additional crystalloid bolus ordered with maintenance therapy.  Patient exhibiting signs of alcohol withdrawal.     Events last 24 hours:   Febrile overnight.  Cultured this am, starting on empiric zosyn.       PAST MEDICAL & SURGICAL HISTORY:  No pertinent past medical history  No significant past surgical history      Allergies  No Known Allergies      FAMILY HISTORY:  Unknown       Social History:   ETOH abuse       Review of Systems:  "I'm hungry"      Physical Examination:    General: Middle aged male, lying in bed, NAD    HEENT: NC/AT, Pupils equal, reactive to light.  Symmetric. NC in place    PULM: Symmetrical thorax expansion upon respiration.  Clear to auscultation bilaterally, no significant sputum production    CVS: Regular rate and rhythm, no murmurs, rubs, or gallops appreciated    ABD: Soft, nondistended, nontender, normoactive bowel sounds, no masses appreciated    EXT: No edema, nontender    SKIN: Warm and well perfused, no rashes noted.    NEURO: Alert, oriented, interactive, nonfocal      Medications:  LORazepam   Injectable   IV Push   LORazepam   Injectable 4 milliGRAM(s) IV Push every 4 hours  LORazepam   Injectable 3 milliGRAM(s) IV Push every 4 hours  dextrose 5% + lactated ringers. 1000 milliLiter(s) IV Continuous <Continuous>  folic acid 1 milliGRAM(s) Oral daily  multivitamin 1 Tablet(s) Oral daily  thiamine 100 milliGRAM(s) Oral daily  chlorhexidine 4% Liquid 1 Application(s) Topical <User Schedule>      ICU Vital Signs Last 24 Hrs  T(C): 38 (2020 07:00), Max: 38.8 (31 Oct 2020 23:00)  T(F): 100.4 (2020 07:00), Max: 101.8 (31 Oct 2020 23:00)  HR: 118 (2020 07:00) (59 - 135)  BP: 149/101 (2020 07:00) (80/58 - 149/101)  BP(mean): 111 (2020 07:00) (63 - 127)  ABP: --  ABP(mean): --  RR: 12 (2020 07:00) (10 - 23)  SpO2: 97% (31 Oct 2020 20:00) (89% - 100%)    Vital Signs Last 24 Hrs  T(C): 38 (2020 07:00), Max: 38.8 (31 Oct 2020 23:00)  T(F): 100.4 (2020 07:00), Max: 101.8 (31 Oct 2020 23:00)  HR: 118 (2020 07:00) (59 - 135)  BP: 149/101 (2020 07:00) (80/58 - 149/101)  BP(mean): 111 (2020 07:00) (63 - 127)  RR: 12 (2020 07:00) (10 - )  SpO2: 97% (31 Oct 2020 20:00) (89% - 100%)      I&O's Detail    31 Oct 2020 08:01  -  2020 07:00  --------------------------------------------------------  IN:    dextrose 5% + lactated ringers: 1200 mL    IV PiggyBack: 400 mL    Lactated Ringers: 700 mL    Oral Fluid: 250 mL  Total IN: 2550 mL    OUT:    Indwelling Catheter - Urethral (mL): 3475 mL  Total OUT: 3475 mL  Total NET: -925 mL      LABS:                        14.4   2.86  )-----------( 155      ( 31 Oct 2020 06:27 )             44.0     10-31    142  |  105  |  8   ----------------------------<  69<L>  3.3<L>   |  25  |  0.50    Ca    7.7<L>      31 Oct 2020 22:58  Phos  2.4     10-31  Mg     1.1     10-31    TPro  6.3  /  Alb  2.9<L>  /  TBili  0.8  /  DBili  0.3<H>  /  AST  230<H>  /  ALT  63  /  AlkPhos  136<H>  10-31      CARDIAC MARKERS ( 31 Oct 2020 13:27 )  x     / x     / 948 U/L / x     / x      CARDIAC MARKERS ( 31 Oct 2020 08:13 )  <0.015 ng/mL / x     / x     / x     / x      CARDIAC MARKERS ( 31 Oct 2020 06:27 )  <0.015 ng/mL / x     / 1089 U/L / x     / x          CAPILLARY BLOOD GLUCOSE  POCT Blood Glucose.: 95 mg/dL (2020 06:23)      Urinalysis Basic - ( 31 Oct 2020 06:52 )  Color: Kelli / Appearance: Slightly Turbid / S.025 / pH: x  Gluc: x / Ketone: Moderate  / Bili: Negative / Urobili: Negative mg/dL   Blood: x / Protein: 500 mg/dL / Nitrite: Negative   Leuk Esterase: Negative / RBC: 3-5 /HPF / WBC 0-2   Sq Epi: x / Non Sq Epi: Few / Bacteria: Few      CULTURES:  Pending      RADIOLOGY:   < from: CT Head No Cont (10.31.20 @ 07:33) >    EXAM:  CT BRAIN                          PROCEDURE DATE:  10/31/2020    INTERPRETATION:  INDICATION:  Altered mental status  TECHNIQUE:  A non contrast 2.5mm axial CT study of the brain was performed from skull base to vertex. Coronal and sagittal reformations were generated from the axial data.  COMPARISON EXAMINATION:  No prior    FINDINGS:    HEMISPHERES:  There are involutional changes and volume loss, with no evidence of an acute infarct, hemorrhage, or mass. There are very mild scattered chronic ischemic changes.  VENTRICLES:  Midline with ex vacuo enlargement.  POSTERIOR FOSSA:  The brain stem and cerebellum are unremarkable.  No CP angle lesion noted.  EXTRACEREBRAL SPACES:  No subdural or epidural collections are noted.  SKULL BASE AND CALVARIUM:  Appears intact.  No fracture or destructive lesion is identified.  SINUSES AND MASTOIDS:  Clear.  MISCELLANEOUS:  No orbital or suprasellar abnormality noted.    IMPRESSION:  1)  mild volume loss with minimal scattered chronicappearing ischemic changes. No acute abnormality or hemorrhage suggested.  2)  follow-up MR imaging recommended for further assessment.    ULISSES SALAS MD; Attending Radiologist  This document has been electronically signed. Oct 31 2020  8:16AM    < end of copied text >      SUPPLEMENTAL O2: RA  LINES: Peripheral   IVF: D5LR  GARG: N  PPx: PPI  CONTACT: N

## 2020-11-02 LAB
SARS-COV-2 IGG SERPL QL IA: NEGATIVE — SIGNIFICANT CHANGE UP
SARS-COV-2 IGM SERPL IA-ACNC: 1.06 INDEX — SIGNIFICANT CHANGE UP

## 2020-11-02 PROCEDURE — 99291 CRITICAL CARE FIRST HOUR: CPT

## 2020-11-02 RX ORDER — POTASSIUM CHLORIDE 20 MEQ
40 PACKET (EA) ORAL ONCE
Refills: 0 | Status: COMPLETED | OUTPATIENT
Start: 2020-11-02 | End: 2020-11-02

## 2020-11-02 RX ADMIN — Medication 1 TABLET(S): at 09:32

## 2020-11-02 RX ADMIN — DEXMEDETOMIDINE HYDROCHLORIDE IN 0.9% SODIUM CHLORIDE 9.38 MICROGRAM(S)/KG/HR: 4 INJECTION INTRAVENOUS at 00:51

## 2020-11-02 RX ADMIN — Medication 3 MILLIGRAM(S): at 17:16

## 2020-11-02 RX ADMIN — Medication 1 MILLIGRAM(S): at 09:32

## 2020-11-02 RX ADMIN — Medication 3 MILLIGRAM(S): at 09:33

## 2020-11-02 RX ADMIN — Medication 3 MILLIGRAM(S): at 13:26

## 2020-11-02 RX ADMIN — PIPERACILLIN AND TAZOBACTAM 25 GRAM(S): 4; .5 INJECTION, POWDER, LYOPHILIZED, FOR SOLUTION INTRAVENOUS at 13:26

## 2020-11-02 RX ADMIN — Medication 3 MILLIGRAM(S): at 02:54

## 2020-11-02 RX ADMIN — Medication 3 MILLIGRAM(S): at 21:06

## 2020-11-02 RX ADMIN — CHLORHEXIDINE GLUCONATE 1 APPLICATION(S): 213 SOLUTION TOPICAL at 09:33

## 2020-11-02 RX ADMIN — Medication 2 MILLIGRAM(S): at 06:11

## 2020-11-02 RX ADMIN — Medication 100 MILLIGRAM(S): at 09:32

## 2020-11-02 RX ADMIN — Medication 40 MILLIEQUIVALENT(S): at 09:33

## 2020-11-02 RX ADMIN — ENOXAPARIN SODIUM 40 MILLIGRAM(S): 100 INJECTION SUBCUTANEOUS at 09:32

## 2020-11-02 RX ADMIN — DEXMEDETOMIDINE HYDROCHLORIDE IN 0.9% SODIUM CHLORIDE 9.38 MICROGRAM(S)/KG/HR: 4 INJECTION INTRAVENOUS at 05:13

## 2020-11-02 RX ADMIN — PIPERACILLIN AND TAZOBACTAM 25 GRAM(S): 4; .5 INJECTION, POWDER, LYOPHILIZED, FOR SOLUTION INTRAVENOUS at 05:11

## 2020-11-02 RX ADMIN — PIPERACILLIN AND TAZOBACTAM 25 GRAM(S): 4; .5 INJECTION, POWDER, LYOPHILIZED, FOR SOLUTION INTRAVENOUS at 21:06

## 2020-11-02 NOTE — PROGRESS NOTE ADULT - ASSESSMENT
A/P:  Patient is a 59 yo Yakut speaking male with presumed hx of ETOH abuse admitted with     1. Hypothermia 2/2 environmental exposure   2. ETOH withdrawal   3. Hypotension/Shock   4. Rhabdo    Plan:  CCU    Change Ativan to 3mg q4h  Precedex drip  Thiamine for chronic ETOH abuse and deffiency  PO Diet  Lovenox DVT Prophylaxis

## 2020-11-02 NOTE — PROGRESS NOTE ADULT - SUBJECTIVE AND OBJECTIVE BOX
Patient is 57 yo Belarusian speaking male endorses his name is Dashawn Wills, doesn't know ) with presumed hx of ETOH abuse found down outside and brought to hospital. Upon presentation, patient was cool to touch, with core temps reading low 80s.  Initially BP was stable 140s-150s, HR in the 60s, confused and unable to answer questions.  Patient given 2L warm IVF and placed on a warming blanket.  Labs significant for WBC 2.86, Ck 1089, and alcohol level 321.  Unknown amount of alcohol ingested and how long he was outside for.  Initial core temp reading ~82F, as patient warming now 89F patient's bp dropping, current BP in the 70s.  Additional crystalloid bolus ordered with maintenance therapy.  Patient exhibiting signs of alcohol withdrawal.     : He remains in DTs on a Precedex drip and IV ativan.            PAST MEDICAL & SURGICAL HISTORY:  No pertinent past medical history    No significant past surgical history        FAMILY HISTORY:      Social Hx:    Allergies    No Known Allergies    Intolerances          Weight (kg): 75 ( @ 23:22)    ICU Vital Signs Last 24 Hrs  T(C): 36.2 (2020 00:01), Max: 38.1 (2020 11:00)  T(F): 97.1 (2020 00:01), Max: 100.6 (2020 11:)  HR: 64 (2020 07:00) (62 - 152)  BP: 107/72 (2020 07:00) (81/58 - 159/101)  BP(mean): 81 (2020 07:00) (63 - 121)  ABP: --  ABP(mean): --  RR: 13 (2020 07:00) (11 - 26)  SpO2: 97% (2020 07:00) (95% - 100%)          I&O's Summary    2020 07:01  -  2020 07:00  --------------------------------------------------------  IN: 3260 mL / OUT: 3850 mL / NET: -590 mL                              12.3   3.46  )-----------( 108      ( 2020 06:26 )             37.0           139  |  101  |  7   ----------------------------<  97  3.3<L>   |  31  |  0.64    Ca    8.5      2020 16:42  Phos  2.7       Mg     2.1         TPro  6.8  /  Alb  3.0<L>  /  TBili  1.0  /  DBili  x   /  AST  211<H>  /  ALT  60  /  AlkPhos  155<H>        CARDIAC MARKERS ( 2020 06:26 )  x     / x     / 868 U/L / x     / x      CARDIAC MARKERS ( 31 Oct 2020 13:27 )  x     / x     / 948 U/L / x     / x                    MEDICATIONS  (STANDING):  chlorhexidine 4% Liquid 1 Application(s) Topical <User Schedule>  dexMEDEtomidine Infusion 0.5 MICROgram(s)/kG/Hr (9.38 mL/Hr) IV Continuous <Continuous>  enoxaparin Injectable 40 milliGRAM(s) SubCutaneous daily  folic acid 1 milliGRAM(s) Oral daily  LORazepam   Injectable 3 milliGRAM(s) IV Push every 4 hours  multivitamin 1 Tablet(s) Oral daily  piperacillin/tazobactam IVPB.. 3.375 Gram(s) IV Intermittent every 8 hours  thiamine 100 milliGRAM(s) Oral daily    MEDICATIONS  (PRN):  acetaminophen   Tablet .. 650 milliGRAM(s) Oral every 6 hours PRN Temp greater or equal to 38C (100.4F)      DVT Prophylaxis:    Advanced Directives:  Discussed with:    Visit Information: 30 min    ** Time is exclusive of billed procedures and/or teaching and/or routine family updates.

## 2020-11-03 LAB
ANION GAP SERPL CALC-SCNC: 7 MMOL/L — SIGNIFICANT CHANGE UP (ref 5–17)
BUN SERPL-MCNC: 10 MG/DL — SIGNIFICANT CHANGE UP (ref 7–23)
CALCIUM SERPL-MCNC: 9.5 MG/DL — SIGNIFICANT CHANGE UP (ref 8.5–10.1)
CHLORIDE SERPL-SCNC: 108 MMOL/L — SIGNIFICANT CHANGE UP (ref 96–108)
CO2 SERPL-SCNC: 26 MMOL/L — SIGNIFICANT CHANGE UP (ref 22–31)
CREAT SERPL-MCNC: 0.5 MG/DL — SIGNIFICANT CHANGE UP (ref 0.5–1.3)
GLUCOSE SERPL-MCNC: 110 MG/DL — HIGH (ref 70–99)
HCT VFR BLD CALC: 39.1 % — SIGNIFICANT CHANGE UP (ref 39–50)
HGB BLD-MCNC: 13 G/DL — SIGNIFICANT CHANGE UP (ref 13–17)
MAGNESIUM SERPL-MCNC: 1.6 MG/DL — SIGNIFICANT CHANGE UP (ref 1.6–2.6)
MCHC RBC-ENTMCNC: 33.2 GM/DL — SIGNIFICANT CHANGE UP (ref 32–36)
MCHC RBC-ENTMCNC: 33.2 PG — SIGNIFICANT CHANGE UP (ref 27–34)
MCV RBC AUTO: 100 FL — SIGNIFICANT CHANGE UP (ref 80–100)
PHOSPHATE SERPL-MCNC: 4.3 MG/DL — SIGNIFICANT CHANGE UP (ref 2.5–4.5)
PLATELET # BLD AUTO: 92 K/UL — LOW (ref 150–400)
POTASSIUM SERPL-MCNC: 3.7 MMOL/L — SIGNIFICANT CHANGE UP (ref 3.5–5.3)
POTASSIUM SERPL-SCNC: 3.7 MMOL/L — SIGNIFICANT CHANGE UP (ref 3.5–5.3)
RBC # BLD: 3.91 M/UL — LOW (ref 4.2–5.8)
RBC # FLD: 12.6 % — SIGNIFICANT CHANGE UP (ref 10.3–14.5)
SODIUM SERPL-SCNC: 141 MMOL/L — SIGNIFICANT CHANGE UP (ref 135–145)
WBC # BLD: 5.19 K/UL — SIGNIFICANT CHANGE UP (ref 3.8–10.5)
WBC # FLD AUTO: 5.19 K/UL — SIGNIFICANT CHANGE UP (ref 3.8–10.5)

## 2020-11-03 PROCEDURE — 99291 CRITICAL CARE FIRST HOUR: CPT

## 2020-11-03 RX ORDER — POTASSIUM CHLORIDE 20 MEQ
10 PACKET (EA) ORAL
Refills: 0 | Status: COMPLETED | OUTPATIENT
Start: 2020-11-03 | End: 2020-11-03

## 2020-11-03 RX ORDER — POTASSIUM CHLORIDE 20 MEQ
10 PACKET (EA) ORAL
Refills: 0 | Status: DISCONTINUED | OUTPATIENT
Start: 2020-11-03 | End: 2020-11-03

## 2020-11-03 RX ORDER — POTASSIUM CHLORIDE 20 MEQ
40 PACKET (EA) ORAL ONCE
Refills: 0 | Status: COMPLETED | OUTPATIENT
Start: 2020-11-03 | End: 2020-11-03

## 2020-11-03 RX ADMIN — PIPERACILLIN AND TAZOBACTAM 25 GRAM(S): 4; .5 INJECTION, POWDER, LYOPHILIZED, FOR SOLUTION INTRAVENOUS at 21:15

## 2020-11-03 RX ADMIN — Medication 650 MILLIGRAM(S): at 12:08

## 2020-11-03 RX ADMIN — ENOXAPARIN SODIUM 40 MILLIGRAM(S): 100 INJECTION SUBCUTANEOUS at 10:11

## 2020-11-03 RX ADMIN — Medication 3 MILLIGRAM(S): at 14:00

## 2020-11-03 RX ADMIN — DEXMEDETOMIDINE HYDROCHLORIDE IN 0.9% SODIUM CHLORIDE 9.38 MICROGRAM(S)/KG/HR: 4 INJECTION INTRAVENOUS at 02:53

## 2020-11-03 RX ADMIN — DEXMEDETOMIDINE HYDROCHLORIDE IN 0.9% SODIUM CHLORIDE 9.38 MICROGRAM(S)/KG/HR: 4 INJECTION INTRAVENOUS at 23:29

## 2020-11-03 RX ADMIN — Medication 40 MILLIEQUIVALENT(S): at 12:08

## 2020-11-03 RX ADMIN — Medication 3 MILLIGRAM(S): at 05:50

## 2020-11-03 RX ADMIN — DEXMEDETOMIDINE HYDROCHLORIDE IN 0.9% SODIUM CHLORIDE 9.38 MICROGRAM(S)/KG/HR: 4 INJECTION INTRAVENOUS at 05:51

## 2020-11-03 RX ADMIN — Medication 3 MILLIGRAM(S): at 21:01

## 2020-11-03 RX ADMIN — Medication 100 MILLIGRAM(S): at 12:08

## 2020-11-03 RX ADMIN — Medication 3 MILLIGRAM(S): at 01:11

## 2020-11-03 RX ADMIN — Medication 1 MILLIGRAM(S): at 12:08

## 2020-11-03 RX ADMIN — CHLORHEXIDINE GLUCONATE 1 APPLICATION(S): 213 SOLUTION TOPICAL at 10:44

## 2020-11-03 RX ADMIN — Medication 1 TABLET(S): at 12:08

## 2020-11-03 RX ADMIN — Medication 3 MILLIGRAM(S): at 18:39

## 2020-11-03 RX ADMIN — Medication 100 MILLIEQUIVALENT(S): at 11:10

## 2020-11-03 RX ADMIN — PIPERACILLIN AND TAZOBACTAM 25 GRAM(S): 4; .5 INJECTION, POWDER, LYOPHILIZED, FOR SOLUTION INTRAVENOUS at 12:54

## 2020-11-03 RX ADMIN — Medication 3 MILLIGRAM(S): at 10:09

## 2020-11-03 RX ADMIN — DEXMEDETOMIDINE HYDROCHLORIDE IN 0.9% SODIUM CHLORIDE 9.38 MICROGRAM(S)/KG/HR: 4 INJECTION INTRAVENOUS at 20:56

## 2020-11-03 RX ADMIN — Medication 100 MILLIEQUIVALENT(S): at 10:08

## 2020-11-03 RX ADMIN — Medication 100 MILLIEQUIVALENT(S): at 08:45

## 2020-11-03 RX ADMIN — PIPERACILLIN AND TAZOBACTAM 25 GRAM(S): 4; .5 INJECTION, POWDER, LYOPHILIZED, FOR SOLUTION INTRAVENOUS at 05:50

## 2020-11-03 NOTE — PROGRESS NOTE ADULT - ASSESSMENT
A/P:  Patient is a 57 yo Malay speaking male with presumed hx of ETOH abuse admitted with     1. Hypothermia 2/2 environmental exposure   2. ETOH withdrawal   3. Hypotension/Shock   4. Rhabdo    Plan:  CCU    Ativan to 3mg q4h  Continue Precedex drip  Thiamine for chronic ETOH abuse and deffiency  PO Diet  Lovenox DVT Prophylaxis

## 2020-11-03 NOTE — PROGRESS NOTE ADULT - SUBJECTIVE AND OBJECTIVE BOX
Patient is 57 yo Turkmen speaking male endorses his name is Dashawn Wills, doesn't know ) with presumed hx of ETOH abuse found down outside and brought to hospital. Upon presentation, patient was cool to touch, with core temps reading low 80s.  Initially BP was stable 140s-150s, HR in the 60s, confused and unable to answer questions.  Patient given 2L warm IVF and placed on a warming blanket.  Labs significant for WBC 2.86, Ck 1089, and alcohol level 321.  Unknown amount of alcohol ingested and how long he was outside for.  Initial core temp reading ~82F, as patient warming now 89F patient's bp dropping, current BP in the 70s.  Additional crystalloid bolus ordered with maintenance therapy.  Patient exhibiting signs of alcohol withdrawal.     : He remains in DTs on a Precedex drip and IV ativan.    11/3: Patient remains in DTs and had to go back on precedex         PAST MEDICAL & SURGICAL HISTORY:  No pertinent past medical history    No significant past surgical history        FAMILY HISTORY:      Social Hx:    Allergies    No Known Allergies    Intolerances            ICU Vital Signs Last 24 Hrs  T(C): 37.1 (2020 05:15), Max: 37.4 (2020 00:25)  T(F): 98.8 (2020 05:15), Max: 99.3 (2020 00:25)  HR: 61 (2020 06:00) (61 - 126)  BP: 151/98 (2020 06:00) (91/70 - 159/106)  BP(mean): 111 (2020 06:00) (75 - 119)  ABP: --  ABP(mean): --  RR: 16 (2020 06:00) (12 - 20)  SpO2: 98% (2020 06:00) (94% - 99%)          I&O's Summary    2020 07:01  -  2020 07:00  --------------------------------------------------------  IN: 330 mL / OUT: 1001 mL / NET: -671 mL                              13.0   5.19  )-----------( 92       ( 2020 06:24 )             39.1       11-03    141  |  108  |  10  ----------------------------<  110<H>  3.7   |  26  |  0.50    Ca    9.5      2020 06:24  Phos  4.3     11-03  Mg     1.6     11-03    MEDICATIONS  (STANDING):  chlorhexidine 4% Liquid 1 Application(s) Topical <User Schedule>  dexMEDEtomidine Infusion 0.5 MICROgram(s)/kG/Hr (9.38 mL/Hr) IV Continuous <Continuous>  enoxaparin Injectable 40 milliGRAM(s) SubCutaneous daily  folic acid 1 milliGRAM(s) Oral daily  LORazepam   Injectable 3 milliGRAM(s) IV Push every 4 hours  multivitamin 1 Tablet(s) Oral daily  piperacillin/tazobactam IVPB.. 3.375 Gram(s) IV Intermittent every 8 hours  potassium chloride   Powder 40 milliEquivalent(s) Oral once  potassium chloride  10 mEq/100 mL IVPB 10 milliEquivalent(s) IV Intermittent every 1 hour  thiamine 100 milliGRAM(s) Oral daily    MEDICATIONS  (PRN):  acetaminophen   Tablet .. 650 milliGRAM(s) Oral every 6 hours PRN Temp greater or equal to 38C (100.4F)      DVT Prophylaxis:    Advanced Directives:  Discussed with:    Visit Information: 30 min    ** Time is exclusive of billed procedures and/or teaching and/or routine family updates.

## 2020-11-04 LAB
ANION GAP SERPL CALC-SCNC: 7 MMOL/L — SIGNIFICANT CHANGE UP (ref 5–17)
APPEARANCE UR: CLEAR — SIGNIFICANT CHANGE UP
BACTERIA # UR AUTO: ABNORMAL
BILIRUB UR-MCNC: NEGATIVE — SIGNIFICANT CHANGE UP
BUN SERPL-MCNC: 13 MG/DL — SIGNIFICANT CHANGE UP (ref 7–23)
CALCIUM SERPL-MCNC: 9.9 MG/DL — SIGNIFICANT CHANGE UP (ref 8.5–10.1)
CHLORIDE SERPL-SCNC: 107 MMOL/L — SIGNIFICANT CHANGE UP (ref 96–108)
CO2 SERPL-SCNC: 28 MMOL/L — SIGNIFICANT CHANGE UP (ref 22–31)
COLOR SPEC: YELLOW — SIGNIFICANT CHANGE UP
CREAT SERPL-MCNC: 0.52 MG/DL — SIGNIFICANT CHANGE UP (ref 0.5–1.3)
DIFF PNL FLD: ABNORMAL
EPI CELLS # UR: SIGNIFICANT CHANGE UP
GLUCOSE SERPL-MCNC: 97 MG/DL — SIGNIFICANT CHANGE UP (ref 70–99)
GLUCOSE UR QL: NEGATIVE MG/DL — SIGNIFICANT CHANGE UP
GRAN CASTS # UR COMP ASSIST: ABNORMAL /LPF
HYALINE CASTS # UR AUTO: ABNORMAL /LPF
KETONES UR-MCNC: NEGATIVE — SIGNIFICANT CHANGE UP
LEUKOCYTE ESTERASE UR-ACNC: NEGATIVE — SIGNIFICANT CHANGE UP
MAGNESIUM SERPL-MCNC: 1.5 MG/DL — LOW (ref 1.6–2.6)
NITRITE UR-MCNC: NEGATIVE — SIGNIFICANT CHANGE UP
PH UR: 7 — SIGNIFICANT CHANGE UP (ref 5–8)
PHOSPHATE SERPL-MCNC: 4.6 MG/DL — HIGH (ref 2.5–4.5)
POTASSIUM SERPL-MCNC: 3.6 MMOL/L — SIGNIFICANT CHANGE UP (ref 3.5–5.3)
POTASSIUM SERPL-SCNC: 3.6 MMOL/L — SIGNIFICANT CHANGE UP (ref 3.5–5.3)
PROT UR-MCNC: 30 MG/DL
RBC CASTS # UR COMP ASSIST: ABNORMAL /HPF (ref 0–4)
SODIUM SERPL-SCNC: 142 MMOL/L — SIGNIFICANT CHANGE UP (ref 135–145)
SP GR SPEC: 1.01 — SIGNIFICANT CHANGE UP (ref 1.01–1.02)
UROBILINOGEN FLD QL: NEGATIVE MG/DL — SIGNIFICANT CHANGE UP
WBC UR QL: SIGNIFICANT CHANGE UP

## 2020-11-04 PROCEDURE — 99291 CRITICAL CARE FIRST HOUR: CPT

## 2020-11-04 RX ORDER — MAGNESIUM SULFATE 500 MG/ML
2 VIAL (ML) INJECTION
Refills: 0 | Status: COMPLETED | OUTPATIENT
Start: 2020-11-04 | End: 2020-11-04

## 2020-11-04 RX ORDER — POTASSIUM CHLORIDE 20 MEQ
10 PACKET (EA) ORAL
Refills: 0 | Status: COMPLETED | OUTPATIENT
Start: 2020-11-04 | End: 2020-11-04

## 2020-11-04 RX ADMIN — DEXMEDETOMIDINE HYDROCHLORIDE IN 0.9% SODIUM CHLORIDE 9.38 MICROGRAM(S)/KG/HR: 4 INJECTION INTRAVENOUS at 07:23

## 2020-11-04 RX ADMIN — Medication 3 MILLIGRAM(S): at 17:43

## 2020-11-04 RX ADMIN — PIPERACILLIN AND TAZOBACTAM 25 GRAM(S): 4; .5 INJECTION, POWDER, LYOPHILIZED, FOR SOLUTION INTRAVENOUS at 06:08

## 2020-11-04 RX ADMIN — DEXMEDETOMIDINE HYDROCHLORIDE IN 0.9% SODIUM CHLORIDE 9.38 MICROGRAM(S)/KG/HR: 4 INJECTION INTRAVENOUS at 16:50

## 2020-11-04 RX ADMIN — DEXMEDETOMIDINE HYDROCHLORIDE IN 0.9% SODIUM CHLORIDE 9.38 MICROGRAM(S)/KG/HR: 4 INJECTION INTRAVENOUS at 19:48

## 2020-11-04 RX ADMIN — Medication 50 GRAM(S): at 11:10

## 2020-11-04 RX ADMIN — CHLORHEXIDINE GLUCONATE 1 APPLICATION(S): 213 SOLUTION TOPICAL at 10:40

## 2020-11-04 RX ADMIN — Medication 3 MILLIGRAM(S): at 10:53

## 2020-11-04 RX ADMIN — DEXMEDETOMIDINE HYDROCHLORIDE IN 0.9% SODIUM CHLORIDE 9.38 MICROGRAM(S)/KG/HR: 4 INJECTION INTRAVENOUS at 21:20

## 2020-11-04 RX ADMIN — DEXMEDETOMIDINE HYDROCHLORIDE IN 0.9% SODIUM CHLORIDE 9.38 MICROGRAM(S)/KG/HR: 4 INJECTION INTRAVENOUS at 10:30

## 2020-11-04 RX ADMIN — DEXMEDETOMIDINE HYDROCHLORIDE IN 0.9% SODIUM CHLORIDE 9.38 MICROGRAM(S)/KG/HR: 4 INJECTION INTRAVENOUS at 15:01

## 2020-11-04 RX ADMIN — DEXMEDETOMIDINE HYDROCHLORIDE IN 0.9% SODIUM CHLORIDE 9.38 MICROGRAM(S)/KG/HR: 4 INJECTION INTRAVENOUS at 02:35

## 2020-11-04 RX ADMIN — Medication 50 MILLIEQUIVALENT(S): at 15:01

## 2020-11-04 RX ADMIN — PIPERACILLIN AND TAZOBACTAM 25 GRAM(S): 4; .5 INJECTION, POWDER, LYOPHILIZED, FOR SOLUTION INTRAVENOUS at 22:48

## 2020-11-04 RX ADMIN — Medication 50 MILLIEQUIVALENT(S): at 15:44

## 2020-11-04 RX ADMIN — DEXMEDETOMIDINE HYDROCHLORIDE IN 0.9% SODIUM CHLORIDE 9.38 MICROGRAM(S)/KG/HR: 4 INJECTION INTRAVENOUS at 18:12

## 2020-11-04 RX ADMIN — Medication 50 GRAM(S): at 11:45

## 2020-11-04 RX ADMIN — DEXMEDETOMIDINE HYDROCHLORIDE IN 0.9% SODIUM CHLORIDE 9.38 MICROGRAM(S)/KG/HR: 4 INJECTION INTRAVENOUS at 22:56

## 2020-11-04 RX ADMIN — DEXMEDETOMIDINE HYDROCHLORIDE IN 0.9% SODIUM CHLORIDE 9.38 MICROGRAM(S)/KG/HR: 4 INJECTION INTRAVENOUS at 00:58

## 2020-11-04 RX ADMIN — DEXMEDETOMIDINE HYDROCHLORIDE IN 0.9% SODIUM CHLORIDE 9.38 MICROGRAM(S)/KG/HR: 4 INJECTION INTRAVENOUS at 13:34

## 2020-11-04 RX ADMIN — Medication 3 MILLIGRAM(S): at 13:34

## 2020-11-04 RX ADMIN — DEXMEDETOMIDINE HYDROCHLORIDE IN 0.9% SODIUM CHLORIDE 9.38 MICROGRAM(S)/KG/HR: 4 INJECTION INTRAVENOUS at 09:03

## 2020-11-04 RX ADMIN — DEXMEDETOMIDINE HYDROCHLORIDE IN 0.9% SODIUM CHLORIDE 9.38 MICROGRAM(S)/KG/HR: 4 INJECTION INTRAVENOUS at 05:41

## 2020-11-04 RX ADMIN — Medication 3 MILLIGRAM(S): at 06:07

## 2020-11-04 RX ADMIN — Medication 50 MILLIEQUIVALENT(S): at 13:34

## 2020-11-04 RX ADMIN — Medication 3 MILLIGRAM(S): at 01:32

## 2020-11-04 RX ADMIN — Medication 3 MILLIGRAM(S): at 21:24

## 2020-11-04 RX ADMIN — PIPERACILLIN AND TAZOBACTAM 25 GRAM(S): 4; .5 INJECTION, POWDER, LYOPHILIZED, FOR SOLUTION INTRAVENOUS at 15:22

## 2020-11-04 RX ADMIN — ENOXAPARIN SODIUM 40 MILLIGRAM(S): 100 INJECTION SUBCUTANEOUS at 10:53

## 2020-11-04 NOTE — PROGRESS NOTE ADULT - SUBJECTIVE AND OBJECTIVE BOX
Patient is 57 yo Hebrew speaking male endorses his name is Dashawn Wills, doesn't know ) with presumed hx of ETOH abuse found down outside and brought to hospital. Upon presentation, patient was cool to touch, with core temps reading low 80s.  Initially BP was stable 140s-150s, HR in the 60s, confused and unable to answer questions.  Patient given 2L warm IVF and placed on a warming blanket.  Labs significant for WBC 2.86, Ck 1089, and alcohol level 321.  Unknown amount of alcohol ingested and how long he was outside for.  Initial core temp reading ~82F, as patient warming now 89F patient's bp dropping, current BP in the 70s.  Additional crystalloid bolus ordered with maintenance therapy.  Patient exhibiting signs of alcohol withdrawal.     : He remains in DTs on a Precedex drip and IV ativan.    11/3: Patient remains in DTs and had to go back on precedex  :  patient continues to be in DTs, multiple CODE man herndon called over night      PAST MEDICAL & SURGICAL HISTORY:  No pertinent past medical history    No significant past surgical history        FAMILY HISTORY:      Social Hx:    Allergies    No Known Allergies    Intolerances            ICU Vital Signs Last 24 Hrs  T(C): 36.2 (2020 05:47), Max: 37.1 (2020 21:07)  T(F): 97.1 (2020 05:47), Max: 98.7 (2020 21:07)  HR: 84 (2020 06:00) (44 - 84)  BP: 160/96 (2020 06:00) (74/48 - 174/101)  BP(mean): 119 (2020 04:00) (54 - 123)  ABP: --  ABP(mean): --  RR: 26 (2020 06:00) (11 - 26)  SpO2: 95% (2020 03:00) (95% - 100%)          I&O's Summary    2020 07:01  -  2020 07:00  --------------------------------------------------------  IN: 1128 mL / OUT: 1151 mL / NET: -23 mL                              13.0   5.19  )-----------( 92       ( 2020 06:24 )             39.1       11-04    142  |  107  |  13  ----------------------------<  97  3.6   |  28  |  0.52    Ca    9.9      2020 06:22  Phos  4.6     11-04  Mg     1.5     11-04                      MEDICATIONS  (STANDING):  chlorhexidine 4% Liquid 1 Application(s) Topical <User Schedule>  dexMEDEtomidine Infusion 0.5 MICROgram(s)/kG/Hr (9.38 mL/Hr) IV Continuous <Continuous>  enoxaparin Injectable 40 milliGRAM(s) SubCutaneous daily  folic acid 1 milliGRAM(s) Oral daily  LORazepam   Injectable 3 milliGRAM(s) IV Push every 4 hours  magnesium sulfate  IVPB 2 Gram(s) IV Intermittent every 2 hours  multivitamin 1 Tablet(s) Oral daily  piperacillin/tazobactam IVPB.. 3.375 Gram(s) IV Intermittent every 8 hours  potassium chloride  10 mEq/50 mL IVPB 10 milliEquivalent(s) IV Intermittent every 1 hour  thiamine 100 milliGRAM(s) Oral daily    MEDICATIONS  (PRN):  acetaminophen   Tablet .. 650 milliGRAM(s) Oral every 6 hours PRN Temp greater or equal to 38C (100.4F)      DVT Prophylaxis:    Advanced Directives:  Discussed with:    Visit Information: 30    ** Time is exclusive of billed procedures and/or teaching and/or routine family updates.

## 2020-11-04 NOTE — PROGRESS NOTE ADULT - ASSESSMENT
A/P:  Patient is a 59 yo Turkish speaking male with presumed hx of ETOH abuse admitted with     1. Hypothermia 2/2 environmental exposure   2. ETOH withdrawal   3. Hypotension/Shock   4. Rhabdo    Plan:  CCU    Ativan to 3mg q4h  Continue Precedex drip  Replace Mg++ and K+  Thiamine for chronic ETOH abuse and deffiency  PO Diet  Lovenox DVT Prophylaxis

## 2020-11-05 LAB
ANION GAP SERPL CALC-SCNC: 5 MMOL/L — SIGNIFICANT CHANGE UP (ref 5–17)
BUN SERPL-MCNC: 9 MG/DL — SIGNIFICANT CHANGE UP (ref 7–23)
CALCIUM SERPL-MCNC: 9.1 MG/DL — SIGNIFICANT CHANGE UP (ref 8.5–10.1)
CHLORIDE SERPL-SCNC: 107 MMOL/L — SIGNIFICANT CHANGE UP (ref 96–108)
CO2 SERPL-SCNC: 28 MMOL/L — SIGNIFICANT CHANGE UP (ref 22–31)
CREAT SERPL-MCNC: 0.56 MG/DL — SIGNIFICANT CHANGE UP (ref 0.5–1.3)
GLUCOSE SERPL-MCNC: 110 MG/DL — HIGH (ref 70–99)
HCT VFR BLD CALC: 40.4 % — SIGNIFICANT CHANGE UP (ref 39–50)
HGB BLD-MCNC: 13.3 G/DL — SIGNIFICANT CHANGE UP (ref 13–17)
LACTATE SERPL-SCNC: 0.9 MMOL/L — SIGNIFICANT CHANGE UP (ref 0.7–2)
MAGNESIUM SERPL-MCNC: 1.5 MG/DL — LOW (ref 1.6–2.6)
MAGNESIUM SERPL-MCNC: 1.6 MG/DL — SIGNIFICANT CHANGE UP (ref 1.6–2.6)
MCHC RBC-ENTMCNC: 32.9 GM/DL — SIGNIFICANT CHANGE UP (ref 32–36)
MCHC RBC-ENTMCNC: 33 PG — SIGNIFICANT CHANGE UP (ref 27–34)
MCV RBC AUTO: 100.2 FL — HIGH (ref 80–100)
PHOSPHATE SERPL-MCNC: 4.7 MG/DL — HIGH (ref 2.5–4.5)
PLATELET # BLD AUTO: 144 K/UL — LOW (ref 150–400)
POTASSIUM SERPL-MCNC: 3.3 MMOL/L — LOW (ref 3.5–5.3)
POTASSIUM SERPL-SCNC: 3.3 MMOL/L — LOW (ref 3.5–5.3)
RBC # BLD: 4.03 M/UL — LOW (ref 4.2–5.8)
RBC # FLD: 12.6 % — SIGNIFICANT CHANGE UP (ref 10.3–14.5)
SODIUM SERPL-SCNC: 140 MMOL/L — SIGNIFICANT CHANGE UP (ref 135–145)
TROPONIN I SERPL-MCNC: 0.02 NG/ML — SIGNIFICANT CHANGE UP (ref 0.01–0.04)
WBC # BLD: 6.16 K/UL — SIGNIFICANT CHANGE UP (ref 3.8–10.5)
WBC # FLD AUTO: 6.16 K/UL — SIGNIFICANT CHANGE UP (ref 3.8–10.5)

## 2020-11-05 PROCEDURE — 93010 ELECTROCARDIOGRAM REPORT: CPT

## 2020-11-05 PROCEDURE — 99291 CRITICAL CARE FIRST HOUR: CPT

## 2020-11-05 RX ORDER — DIPHENHYDRAMINE HCL 50 MG
25 CAPSULE ORAL ONCE
Refills: 0 | Status: COMPLETED | OUTPATIENT
Start: 2020-11-05 | End: 2020-11-05

## 2020-11-05 RX ORDER — NOREPINEPHRINE BITARTRATE/D5W 8 MG/250ML
0.05 PLASTIC BAG, INJECTION (ML) INTRAVENOUS
Qty: 8 | Refills: 0 | Status: DISCONTINUED | OUTPATIENT
Start: 2020-11-05 | End: 2020-11-06

## 2020-11-05 RX ORDER — POTASSIUM CHLORIDE 20 MEQ
10 PACKET (EA) ORAL
Refills: 0 | Status: COMPLETED | OUTPATIENT
Start: 2020-11-05 | End: 2020-11-05

## 2020-11-05 RX ORDER — MAGNESIUM SULFATE 500 MG/ML
2 VIAL (ML) INJECTION ONCE
Refills: 0 | Status: COMPLETED | OUTPATIENT
Start: 2020-11-05 | End: 2020-11-05

## 2020-11-05 RX ORDER — SODIUM CHLORIDE 9 MG/ML
1000 INJECTION INTRAMUSCULAR; INTRAVENOUS; SUBCUTANEOUS ONCE
Refills: 0 | Status: COMPLETED | OUTPATIENT
Start: 2020-11-05 | End: 2020-11-05

## 2020-11-05 RX ADMIN — ENOXAPARIN SODIUM 40 MILLIGRAM(S): 100 INJECTION SUBCUTANEOUS at 09:04

## 2020-11-05 RX ADMIN — DEXMEDETOMIDINE HYDROCHLORIDE IN 0.9% SODIUM CHLORIDE 9.38 MICROGRAM(S)/KG/HR: 4 INJECTION INTRAVENOUS at 03:58

## 2020-11-05 RX ADMIN — Medication 1 MILLIGRAM(S): at 09:05

## 2020-11-05 RX ADMIN — Medication 3 MILLIGRAM(S): at 17:11

## 2020-11-05 RX ADMIN — Medication 3 MILLIGRAM(S): at 13:37

## 2020-11-05 RX ADMIN — Medication 50 GRAM(S): at 13:11

## 2020-11-05 RX ADMIN — PIPERACILLIN AND TAZOBACTAM 25 GRAM(S): 4; .5 INJECTION, POWDER, LYOPHILIZED, FOR SOLUTION INTRAVENOUS at 13:13

## 2020-11-05 RX ADMIN — SODIUM CHLORIDE 1000 MILLILITER(S): 9 INJECTION INTRAMUSCULAR; INTRAVENOUS; SUBCUTANEOUS at 13:37

## 2020-11-05 RX ADMIN — PIPERACILLIN AND TAZOBACTAM 25 GRAM(S): 4; .5 INJECTION, POWDER, LYOPHILIZED, FOR SOLUTION INTRAVENOUS at 06:28

## 2020-11-05 RX ADMIN — CHLORHEXIDINE GLUCONATE 1 APPLICATION(S): 213 SOLUTION TOPICAL at 06:28

## 2020-11-05 RX ADMIN — Medication 1 TABLET(S): at 09:05

## 2020-11-05 RX ADMIN — DEXMEDETOMIDINE HYDROCHLORIDE IN 0.9% SODIUM CHLORIDE 9.38 MICROGRAM(S)/KG/HR: 4 INJECTION INTRAVENOUS at 02:22

## 2020-11-05 RX ADMIN — Medication 100 MILLIEQUIVALENT(S): at 08:52

## 2020-11-05 RX ADMIN — Medication 25 MILLIGRAM(S): at 23:57

## 2020-11-05 RX ADMIN — Medication 3 MILLIGRAM(S): at 09:04

## 2020-11-05 RX ADMIN — Medication 3 MILLIGRAM(S): at 06:13

## 2020-11-05 RX ADMIN — Medication 100 MILLIEQUIVALENT(S): at 09:52

## 2020-11-05 RX ADMIN — Medication 3 MILLIGRAM(S): at 21:31

## 2020-11-05 RX ADMIN — Medication 100 MILLIGRAM(S): at 09:05

## 2020-11-05 RX ADMIN — DEXMEDETOMIDINE HYDROCHLORIDE IN 0.9% SODIUM CHLORIDE 9.38 MICROGRAM(S)/KG/HR: 4 INJECTION INTRAVENOUS at 05:00

## 2020-11-05 RX ADMIN — DEXMEDETOMIDINE HYDROCHLORIDE IN 0.9% SODIUM CHLORIDE 9.38 MICROGRAM(S)/KG/HR: 4 INJECTION INTRAVENOUS at 00:45

## 2020-11-05 RX ADMIN — PIPERACILLIN AND TAZOBACTAM 25 GRAM(S): 4; .5 INJECTION, POWDER, LYOPHILIZED, FOR SOLUTION INTRAVENOUS at 21:31

## 2020-11-05 RX ADMIN — Medication 3 MILLIGRAM(S): at 01:17

## 2020-11-05 RX ADMIN — DEXMEDETOMIDINE HYDROCHLORIDE IN 0.9% SODIUM CHLORIDE 9.38 MICROGRAM(S)/KG/HR: 4 INJECTION INTRAVENOUS at 08:14

## 2020-11-05 RX ADMIN — Medication 100 MILLIEQUIVALENT(S): at 07:56

## 2020-11-05 NOTE — PROGRESS NOTE ADULT - SUBJECTIVE AND OBJECTIVE BOX
Patient is 57 yo Luxembourgish speaking male endorses his name is Dashawn Wills, doesn't know ) with presumed hx of ETOH abuse found down outside and brought to hospital. Upon presentation, patient was cool to touch, with core temps reading low 80s.  Initially BP was stable 140s-150s, HR in the 60s, confused and unable to answer questions.  Patient given 2L warm IVF and placed on a warming blanket.  Labs significant for WBC 2.86, Ck 1089, and alcohol level 321.  Unknown amount of alcohol ingested and how long he was outside for.  Initial core temp reading ~82F, as patient warming now 89F patient's bp dropping, current BP in the 70s.  Additional crystalloid bolus ordered with maintenance therapy.  Patient exhibiting signs of alcohol withdrawal.     : He remains in DTs on a Precedex drip and IV ativan.    11/3: Patient remains in DTs and had to go back on precedex  :  patient continues to be in DTs, multiple CODE man herndon called over night  : He remained on Precedex and IV ativan over night for DTs       PAST MEDICAL & SURGICAL HISTORY:  No pertinent past medical history    No significant past surgical history        FAMILY HISTORY:      Social Hx:    Allergies    No Known Allergies    Intolerances            ICU Vital Signs Last 24 Hrs  T(C): 36.4 (2020 00:08), Max: 36.6 (2020 14:02)  T(F): 97.6 (2020 00:08), Max: 97.9 (2020 14:02)  HR: 55 (2020 08:00) (50 - 79)  BP: 137/92 (2020 08:00) (119/79 - 176/104)  BP(mean): 103 (2020 08:00) (88 - 120)  ABP: --  ABP(mean): --  RR: 17 (2020 08:00) (12 - 23)  SpO2: 96% (2020 08:00) (93% - 99%)          I&O's Summary    2020 07:01  -  2020 07:00  --------------------------------------------------------  IN: 1159 mL / OUT: 625 mL / NET: 534 mL                              13.3   6.16  )-----------( 144      ( 2020 06:22 )             40.4       11-    140  |  107  |  9   ----------------------------<  110<H>  3.3<L>   |  28  |  0.56    Ca    9.1      2020 06:22  Phos  4.7     11-  Mg     1.6                           MEDICATIONS  (STANDING):  chlorhexidine 4% Liquid 1 Application(s) Topical <User Schedule>  dexMEDEtomidine Infusion 0.5 MICROgram(s)/kG/Hr (9.38 mL/Hr) IV Continuous <Continuous>  enoxaparin Injectable 40 milliGRAM(s) SubCutaneous daily  folic acid 1 milliGRAM(s) Oral daily  LORazepam   Injectable 3 milliGRAM(s) IV Push every 4 hours  multivitamin 1 Tablet(s) Oral daily  piperacillin/tazobactam IVPB.. 3.375 Gram(s) IV Intermittent every 8 hours  potassium chloride  10 mEq/100 mL IVPB 10 milliEquivalent(s) IV Intermittent every 1 hour  thiamine 100 milliGRAM(s) Oral daily    MEDICATIONS  (PRN):  acetaminophen   Tablet .. 650 milliGRAM(s) Oral every 6 hours PRN Temp greater or equal to 38C (100.4F)      DVT Prophylaxis:    Advanced Directives:  Discussed with:    Visit Information: 30 min    ** Time is exclusive of billed procedures and/or teaching and/or routine family updates.

## 2020-11-05 NOTE — PROGRESS NOTE ADULT - ASSESSMENT
A/P:  Patient is a 57 yo Malay speaking male with presumed hx of ETOH abuse admitted with     1. Hypothermia 2/2 environmental exposure   2. ETOH withdrawal   3. Hypotension/Shock   4. Rhabdo    Plan:  CCU    Ativan to 3mg q4h  Continue Precedex drip--will try to hold it again today.  His DTs should be improving  Replace Mg++ and K+  Thiamine for chronic ETOH abuse and deffiency  PO Diet  Lovenox DVT Prophylaxis

## 2020-11-05 NOTE — PROVIDER CONTACT NOTE (CHANGE IN STATUS NOTIFICATION) - ACTION/TREATMENT ORDERED:
Blood sugar 107  Blood pressure 110 systolic post bolus  Norepinephrine not given  Labs noted  Magnesium 2 grams IVPB given  Pt awake now and eating lunch  Alert and watching TV

## 2020-11-06 LAB
ANION GAP SERPL CALC-SCNC: 7 MMOL/L — SIGNIFICANT CHANGE UP (ref 5–17)
BUN SERPL-MCNC: 11 MG/DL — SIGNIFICANT CHANGE UP (ref 7–23)
CALCIUM SERPL-MCNC: 9 MG/DL — SIGNIFICANT CHANGE UP (ref 8.5–10.1)
CHLORIDE SERPL-SCNC: 108 MMOL/L — SIGNIFICANT CHANGE UP (ref 96–108)
CO2 SERPL-SCNC: 27 MMOL/L — SIGNIFICANT CHANGE UP (ref 22–31)
CREAT SERPL-MCNC: 0.71 MG/DL — SIGNIFICANT CHANGE UP (ref 0.5–1.3)
CULTURE RESULTS: SIGNIFICANT CHANGE UP
CULTURE RESULTS: SIGNIFICANT CHANGE UP
GLUCOSE SERPL-MCNC: 80 MG/DL — SIGNIFICANT CHANGE UP (ref 70–99)
HCT VFR BLD CALC: 40.7 % — SIGNIFICANT CHANGE UP (ref 39–50)
HGB BLD-MCNC: 13.1 G/DL — SIGNIFICANT CHANGE UP (ref 13–17)
MAGNESIUM SERPL-MCNC: 1.7 MG/DL — SIGNIFICANT CHANGE UP (ref 1.6–2.6)
MCHC RBC-ENTMCNC: 32.2 GM/DL — SIGNIFICANT CHANGE UP (ref 32–36)
MCHC RBC-ENTMCNC: 32.8 PG — SIGNIFICANT CHANGE UP (ref 27–34)
MCV RBC AUTO: 102 FL — HIGH (ref 80–100)
PHOSPHATE SERPL-MCNC: 3.5 MG/DL — SIGNIFICANT CHANGE UP (ref 2.5–4.5)
PLATELET # BLD AUTO: 180 K/UL — SIGNIFICANT CHANGE UP (ref 150–400)
POTASSIUM SERPL-MCNC: 3.3 MMOL/L — LOW (ref 3.5–5.3)
POTASSIUM SERPL-SCNC: 3.3 MMOL/L — LOW (ref 3.5–5.3)
RBC # BLD: 3.99 M/UL — LOW (ref 4.2–5.8)
RBC # FLD: 12.5 % — SIGNIFICANT CHANGE UP (ref 10.3–14.5)
SODIUM SERPL-SCNC: 142 MMOL/L — SIGNIFICANT CHANGE UP (ref 135–145)
SPECIMEN SOURCE: SIGNIFICANT CHANGE UP
SPECIMEN SOURCE: SIGNIFICANT CHANGE UP
WBC # BLD: 6.51 K/UL — SIGNIFICANT CHANGE UP (ref 3.8–10.5)
WBC # FLD AUTO: 6.51 K/UL — SIGNIFICANT CHANGE UP (ref 3.8–10.5)

## 2020-11-06 PROCEDURE — 99291 CRITICAL CARE FIRST HOUR: CPT

## 2020-11-06 RX ORDER — POTASSIUM CHLORIDE 20 MEQ
40 PACKET (EA) ORAL ONCE
Refills: 0 | Status: COMPLETED | OUTPATIENT
Start: 2020-11-06 | End: 2020-11-06

## 2020-11-06 RX ORDER — POTASSIUM CHLORIDE 20 MEQ
10 PACKET (EA) ORAL
Refills: 0 | Status: COMPLETED | OUTPATIENT
Start: 2020-11-06 | End: 2020-11-06

## 2020-11-06 RX ADMIN — Medication 3 MILLIGRAM(S): at 02:27

## 2020-11-06 RX ADMIN — Medication 1 TABLET(S): at 09:01

## 2020-11-06 RX ADMIN — Medication 4 MILLIGRAM(S): at 01:05

## 2020-11-06 RX ADMIN — Medication 4 MILLIGRAM(S): at 11:18

## 2020-11-06 RX ADMIN — Medication 3 MILLIGRAM(S): at 13:07

## 2020-11-06 RX ADMIN — Medication 40 MILLIEQUIVALENT(S): at 08:32

## 2020-11-06 RX ADMIN — Medication 40 MILLIEQUIVALENT(S): at 11:48

## 2020-11-06 RX ADMIN — PIPERACILLIN AND TAZOBACTAM 25 GRAM(S): 4; .5 INJECTION, POWDER, LYOPHILIZED, FOR SOLUTION INTRAVENOUS at 06:09

## 2020-11-06 RX ADMIN — Medication 4 MILLIGRAM(S): at 03:34

## 2020-11-06 RX ADMIN — Medication 3 MILLIGRAM(S): at 09:01

## 2020-11-06 RX ADMIN — Medication 3 MILLIGRAM(S): at 17:55

## 2020-11-06 RX ADMIN — Medication 3 MILLIGRAM(S): at 06:08

## 2020-11-06 RX ADMIN — Medication 100 MILLIEQUIVALENT(S): at 10:51

## 2020-11-06 RX ADMIN — Medication 100 MILLIGRAM(S): at 09:01

## 2020-11-06 RX ADMIN — ENOXAPARIN SODIUM 40 MILLIGRAM(S): 100 INJECTION SUBCUTANEOUS at 09:01

## 2020-11-06 RX ADMIN — Medication 100 MILLIEQUIVALENT(S): at 08:32

## 2020-11-06 RX ADMIN — Medication 1 MILLIGRAM(S): at 09:01

## 2020-11-06 RX ADMIN — CHLORHEXIDINE GLUCONATE 1 APPLICATION(S): 213 SOLUTION TOPICAL at 09:00

## 2020-11-06 RX ADMIN — Medication 100 MILLIEQUIVALENT(S): at 09:35

## 2020-11-06 RX ADMIN — Medication 3 MILLIGRAM(S): at 21:07

## 2020-11-06 RX ADMIN — Medication 4 MILLIGRAM(S): at 04:18

## 2020-11-06 NOTE — PROGRESS NOTE ADULT - SUBJECTIVE AND OBJECTIVE BOX
Patient is 59 yo Hebrew speaking male endorses his name is Dashawn Wills, doesn't know ) with presumed hx of ETOH abuse found down outside and brought to hospital. Upon presentation, patient was cool to touch, with core temps reading low 80s.  Initially BP was stable 140s-150s, HR in the 60s, confused and unable to answer questions.  Patient given 2L warm IVF and placed on a warming blanket.  Labs significant for WBC 2.86, Ck 1089, and alcohol level 321.  Unknown amount of alcohol ingested and how long he was outside for.  Initial core temp reading ~82F, as patient warming now 89F patient's bp dropping, current BP in the 70s.  Additional crystalloid bolus ordered with maintenance therapy.  Patient exhibiting signs of alcohol withdrawal.     : He remains in DTs on a Precedex drip and IV ativan.    11/3: Patient remains in DTs and had to go back on precedex  :  patient continues to be in DTs, multiple CODE man herndon called over night  : He remained on Precedex and IV ativan over night for DTs  : He remains in DT, ants crawling over his body     PAST MEDICAL & SURGICAL HISTORY:  No pertinent past medical history    No significant past surgical history        FAMILY HISTORY:      Social Hx:    Allergies    No Known Allergies    Intolerances            ICU Vital Signs Last 24 Hrs  T(C): 37.4 (2020 04:10), Max: 37.4 (2020 21:12)  T(F): 99.4 (2020 04:10), Max: 99.4 (2020 21:12)  HR: 122 (2020 07:00) (62 - 143)  BP: 116/99 (2020 07:00) (53/36 - 169/99)  BP(mean): 115 (2020 07:00) (38 - 150)  ABP: --  ABP(mean): --  RR: 17 (2020 07:00) (12 - 42)  SpO2: 100% (2020 12:00) (86% - 100%)          I&O's Summary    2020 07:01  -  2020 07:00  --------------------------------------------------------  IN: 1480 mL / OUT: 1470 mL / NET: 10 mL    2020 07:01  -  2020 09:55  --------------------------------------------------------  IN: 0 mL / OUT: 150 mL / NET: -150 mL                              13.1   6.51  )-----------( 180      ( 2020 06:10 )             40.7       11-06    142  |  108  |  11  ----------------------------<  80  3.3<L>   |  27  |  0.71    Ca    9.0      2020 06:10  Phos  3.5     11-06  Mg     1.7     11-06        CARDIAC MARKERS ( 2020 11:15 )  0.019 ng/mL / x     / x     / x     / x                    MEDICATIONS  (STANDING):  chlorhexidine 4% Liquid 1 Application(s) Topical <User Schedule>  dexMEDEtomidine Infusion 0.5 MICROgram(s)/kG/Hr (9.38 mL/Hr) IV Continuous <Continuous>  enoxaparin Injectable 40 milliGRAM(s) SubCutaneous daily  folic acid 1 milliGRAM(s) Oral daily  LORazepam   Injectable 3 milliGRAM(s) IV Push every 4 hours  multivitamin 1 Tablet(s) Oral daily  norepinephrine Infusion 0.05 MICROgram(s)/kG/Min (7.03 mL/Hr) IV Continuous <Continuous>  piperacillin/tazobactam IVPB.. 3.375 Gram(s) IV Intermittent every 8 hours  potassium chloride  10 mEq/100 mL IVPB 10 milliEquivalent(s) IV Intermittent every 1 hour  thiamine 100 milliGRAM(s) Oral daily    MEDICATIONS  (PRN):  acetaminophen   Tablet .. 650 milliGRAM(s) Oral every 6 hours PRN Temp greater or equal to 38C (100.4F)      DVT Prophylaxis:    Advanced Directives:  Discussed with:    Visit Information: 30 min    ** Time is exclusive of billed procedures and/or teaching and/or routine family updates.

## 2020-11-06 NOTE — PROGRESS NOTE ADULT - ASSESSMENT
A/P:  Patient is a 57 yo Maori speaking male with presumed hx of ETOH abuse admitted with     1. Hypothermia 2/2 environmental exposure   2. ETOH withdrawal   3. Hypotension/Shock   4. Rhabdo    Plan:  CCU    Ativan to 3mg q4h  D/C Precedex drip--will try to hold it again today.  His DTs should be improving  Replace K+  D/C zosyn  Thiamine for chronic ETOH abuse and deffiency  PO Diet  Lovenox DVT Prophylaxis

## 2020-11-06 NOTE — PROVIDER CONTACT NOTE (OTHER) - SITUATION
pt itching uncontrollably with no rash noted, PA called, benadryl ordered and given with little effect. pt then stated he was hallucinating ants in his bed. AM[facility] called again

## 2020-11-07 LAB
ANION GAP SERPL CALC-SCNC: 6 MMOL/L — SIGNIFICANT CHANGE UP (ref 5–17)
BUN SERPL-MCNC: 15 MG/DL — SIGNIFICANT CHANGE UP (ref 7–23)
CALCIUM SERPL-MCNC: 9.8 MG/DL — SIGNIFICANT CHANGE UP (ref 8.5–10.1)
CHLORIDE SERPL-SCNC: 110 MMOL/L — HIGH (ref 96–108)
CO2 SERPL-SCNC: 26 MMOL/L — SIGNIFICANT CHANGE UP (ref 22–31)
CREAT SERPL-MCNC: 0.82 MG/DL — SIGNIFICANT CHANGE UP (ref 0.5–1.3)
GLUCOSE SERPL-MCNC: 94 MG/DL — SIGNIFICANT CHANGE UP (ref 70–99)
HCT VFR BLD CALC: 40.4 % — SIGNIFICANT CHANGE UP (ref 39–50)
HGB BLD-MCNC: 13.4 G/DL — SIGNIFICANT CHANGE UP (ref 13–17)
MAGNESIUM SERPL-MCNC: 1.7 MG/DL — SIGNIFICANT CHANGE UP (ref 1.6–2.6)
MCHC RBC-ENTMCNC: 33.2 GM/DL — SIGNIFICANT CHANGE UP (ref 32–36)
MCHC RBC-ENTMCNC: 33.4 PG — SIGNIFICANT CHANGE UP (ref 27–34)
MCV RBC AUTO: 100.7 FL — HIGH (ref 80–100)
PHOSPHATE SERPL-MCNC: 3.6 MG/DL — SIGNIFICANT CHANGE UP (ref 2.5–4.5)
PLATELET # BLD AUTO: 205 K/UL — SIGNIFICANT CHANGE UP (ref 150–400)
POTASSIUM SERPL-MCNC: 4 MMOL/L — SIGNIFICANT CHANGE UP (ref 3.5–5.3)
POTASSIUM SERPL-SCNC: 4 MMOL/L — SIGNIFICANT CHANGE UP (ref 3.5–5.3)
RBC # BLD: 4.01 M/UL — LOW (ref 4.2–5.8)
RBC # FLD: 12.5 % — SIGNIFICANT CHANGE UP (ref 10.3–14.5)
SODIUM SERPL-SCNC: 142 MMOL/L — SIGNIFICANT CHANGE UP (ref 135–145)
WBC # BLD: 6.99 K/UL — SIGNIFICANT CHANGE UP (ref 3.8–10.5)
WBC # FLD AUTO: 6.99 K/UL — SIGNIFICANT CHANGE UP (ref 3.8–10.5)

## 2020-11-07 PROCEDURE — 99233 SBSQ HOSP IP/OBS HIGH 50: CPT

## 2020-11-07 RX ORDER — MAGNESIUM SULFATE 500 MG/ML
2 VIAL (ML) INJECTION ONCE
Refills: 0 | Status: COMPLETED | OUTPATIENT
Start: 2020-11-07 | End: 2020-11-07

## 2020-11-07 RX ADMIN — Medication 1 TABLET(S): at 12:42

## 2020-11-07 RX ADMIN — Medication 3 MILLIGRAM(S): at 06:34

## 2020-11-07 RX ADMIN — Medication 2 MILLIGRAM(S): at 12:39

## 2020-11-07 RX ADMIN — Medication 100 MILLIGRAM(S): at 12:42

## 2020-11-07 RX ADMIN — ENOXAPARIN SODIUM 40 MILLIGRAM(S): 100 INJECTION SUBCUTANEOUS at 12:42

## 2020-11-07 RX ADMIN — Medication 1 MILLIGRAM(S): at 12:42

## 2020-11-07 RX ADMIN — Medication 25 GRAM(S): at 09:59

## 2020-11-07 RX ADMIN — Medication 3 MILLIGRAM(S): at 02:02

## 2020-11-07 RX ADMIN — Medication 2 MILLIGRAM(S): at 20:16

## 2020-11-07 NOTE — PROGRESS NOTE ADULT - SUBJECTIVE AND OBJECTIVE BOX
Patient is 59 yo Mohawk speaking male endorses his name is Dashawn Wills, doesn't know ) with presumed hx of ETOH abuse found down outside and brought to hospital. Upon presentation, patient was cool to touch, with core temps reading low 80s.  Initially BP was stable 140s-150s, HR in the 60s, confused and unable to answer questions.  Patient given 2L warm IVF and placed on a warming blanket.  Labs significant for WBC 2.86, Ck 1089, and alcohol level 321.  Unknown amount of alcohol ingested and how long he was outside for.  Initial core temp reading ~82F, as patient warming now 89F patient's bp dropping, current BP in the 70s.  Additional crystalloid bolus ordered with maintenance therapy.  Patient exhibiting signs of alcohol withdrawal.     : He remains in DTs on a Precedex drip and IV ativan.    11/3: Patient remains in DTs and had to go back on precedex  :  patient continues to be in DTs, multiple CODE man herndon called over night  : He remained on Precedex and IV ativan over night for DTs  : He remains in DT, ants crawling over his body   : Was somewhat better over night.           PAST MEDICAL & SURGICAL HISTORY:  No pertinent past medical history    No significant past surgical history        FAMILY HISTORY:      Social Hx:    Allergies    No Known Allergies    Intolerances            ICU Vital Signs Last 24 Hrs  T(C): 36.8 (2020 18:05), Max: 36.8 (2020 14:03)  T(F): 98.2 (2020 18:05), Max: 98.3 (2020 14:03)  HR: 114 (2020 09:00) (89 - 145)  BP: 119/93 (2020 09:00) (91/52 - 176/99)  BP(mean): 100 (2020 09:00) (61 - 128)  ABP: --  ABP(mean): --  RR: 16 (2020 09:00) (11 - 22)  SpO2: 96% (2020 15:00) (96% - 99%)          I&O's Summary    2020 07:01  -  2020 07:00  --------------------------------------------------------  IN: 600 mL / OUT: 950 mL / NET: -350 mL                              13.4   6.99  )-----------( 205      ( 2020 06:28 )             40.4       11-07    142  |  110<H>  |  15  ----------------------------<  94  4.0   |  26  |  0.82    Ca    9.8      2020 06:28  Phos  3.6     11-07  Mg     1.7     11-07        CARDIAC MARKERS ( 2020 11:15 )  0.019 ng/mL / x     / x     / x     / x                    MEDICATIONS  (STANDING):  chlorhexidine 4% Liquid 1 Application(s) Topical <User Schedule>  enoxaparin Injectable 40 milliGRAM(s) SubCutaneous daily  folic acid 1 milliGRAM(s) Oral daily  LORazepam   Injectable 2 milliGRAM(s) IV Push every 6 hours  magnesium sulfate  IVPB 2 Gram(s) IV Intermittent once  multivitamin 1 Tablet(s) Oral daily  thiamine 100 milliGRAM(s) Oral daily    MEDICATIONS  (PRN):  acetaminophen   Tablet .. 650 milliGRAM(s) Oral every 6 hours PRN Temp greater or equal to 38C (100.4F)      DVT Prophylaxis:    Advanced Directives:  Discussed with:    Visit Information:    ** Time is exclusive of billed procedures and/or teaching and/or routine family updates.

## 2020-11-07 NOTE — PROGRESS NOTE ADULT - ASSESSMENT
A/P:  Patient is a 57 yo Polish speaking male with presumed hx of ETOH abuse admitted with     1. Hypothermia 2/2 environmental exposure   2. ETOH withdrawal   3. Hypotension/Shock   4. Rhabdo    Plan:  CCU    Ativan to 2mg q6h  Replace MG++  Thiamine for chronic ETOH abuse and deffiency  PO Diet  Lovenox DVT Prophylaxis

## 2020-11-08 PROCEDURE — 99232 SBSQ HOSP IP/OBS MODERATE 35: CPT

## 2020-11-08 RX ADMIN — Medication 2 MILLIGRAM(S): at 01:31

## 2020-11-08 RX ADMIN — Medication 1 TABLET(S): at 17:11

## 2020-11-08 RX ADMIN — Medication 2 MILLIGRAM(S): at 10:53

## 2020-11-08 RX ADMIN — Medication 1 MILLIGRAM(S): at 10:53

## 2020-11-08 RX ADMIN — Medication 2 MILLIGRAM(S): at 23:46

## 2020-11-08 RX ADMIN — Medication 100 MILLIGRAM(S): at 17:11

## 2020-11-08 RX ADMIN — ENOXAPARIN SODIUM 40 MILLIGRAM(S): 100 INJECTION SUBCUTANEOUS at 10:53

## 2020-11-08 RX ADMIN — Medication 2 MILLIGRAM(S): at 06:12

## 2020-11-08 NOTE — PROGRESS NOTE ADULT - RS GEN PE MLT RESP DETAILS PC
breath sounds equal
no wheezes/no rhonchi/no rales/breath sounds equal
no rhonchi/breath sounds equal/no rales/no wheezes
no rhonchi/breath sounds equal/no rales
no wheezes/no rhonchi/no rales/breath sounds equal
no rhonchi/no wheezes/no rales/breath sounds equal
no rhonchi/breath sounds equal/no rales/no wheezes

## 2020-11-08 NOTE — PROGRESS NOTE ADULT - MS EXT PE MLT D E PC
no clubbing/no cyanosis
no clubbing/no pedal edema/no cyanosis
no pedal edema/no cyanosis/no clubbing
no clubbing/no cyanosis/no pedal edema
no cyanosis/no pedal edema/no clubbing

## 2020-11-08 NOTE — PROGRESS NOTE ADULT - SUBJECTIVE AND OBJECTIVE BOX
Patient is 57 yo German speaking male endorses his name is Dashawn Wills, doesn't know ) with presumed hx of ETOH abuse found down outside and brought to hospital. Upon presentation, patient was cool to touch, with core temps reading low 80s.  Initially BP was stable 140s-150s, HR in the 60s, confused and unable to answer questions.  Patient given 2L warm IVF and placed on a warming blanket.  Labs significant for WBC 2.86, Ck 1089, and alcohol level 321.  Unknown amount of alcohol ingested and how long he was outside for.  Initial core temp reading ~82F, as patient warming now 89F patient's bp dropping, current BP in the 70s.  Additional crystalloid bolus ordered with maintenance therapy.  Patient exhibiting signs of alcohol withdrawal.     : He remains in DTs on a Precedex drip and IV ativan.    11/3: Patient remains in DTs and had to go back on precedex  :  patient continues to be in DTs, multiple CODE man herndon called over night  : He remained on Precedex and IV ativan over night for DTs  : He remains in DT, ants crawling over his body   : Was somewhat better over night.    : No events over night         PAST MEDICAL & SURGICAL HISTORY:  No pertinent past medical history    No significant past surgical history        FAMILY HISTORY:      Social Hx:    Allergies    No Known Allergies    Intolerances            ICU Vital Signs Last 24 Hrs  T(C): 36.9 (2020 12:00), Max: 36.9 (2020 12:00)  T(F): 98.4 (2020 12:00), Max: 98.4 (2020 12:00)  HR: 103 (2020 02:00) (78 - 137)  BP: 112/63 (2020 02:00) (74/41 - 119/93)  BP(mean): 73 (2020 02:00) (47 - 100)  ABP: --  ABP(mean): --  RR: 15 (2020 01:00) (12 - 30)  SpO2: --          I&O's Summary    2020 07:01  -  2020 07:00  --------------------------------------------------------  IN: 360 mL / OUT: 400 mL / NET: -40 mL                              13.4   6.99  )-----------( 205      ( 2020 06:28 )             40.4       11-07    142  |  110<H>  |  15  ----------------------------<  94  4.0   |  26  |  0.82    Ca    9.8      2020 06:28  Phos  3.6     11-07  Mg     1.7     11-07                      MEDICATIONS  (STANDING):  chlorhexidine 4% Liquid 1 Application(s) Topical <User Schedule>  enoxaparin Injectable 40 milliGRAM(s) SubCutaneous daily  folic acid 1 milliGRAM(s) Oral daily  LORazepam   Injectable 2 milliGRAM(s) IV Push every 6 hours  multivitamin 1 Tablet(s) Oral daily  thiamine 100 milliGRAM(s) Oral daily    MEDICATIONS  (PRN):  acetaminophen   Tablet .. 650 milliGRAM(s) Oral every 6 hours PRN Temp greater or equal to 38C (100.4F)  LORazepam   Injectable 2 milliGRAM(s) IV Push every 2 hours PRN breakthrough withdrawal symptoms      DVT Prophylaxis:    Advanced Directives:  Discussed with:    Visit Information:    ** Time is exclusive of billed procedures and/or teaching and/or routine family updates.

## 2020-11-08 NOTE — PROGRESS NOTE ADULT - CARDIOVASCULAR DETAILS
positive S1/positive S2
positive S2/positive S1
positive S1/positive S2
positive S1/positive S2
positive S2/positive S1
positive S2/positive S1
positive S1/positive S2

## 2020-11-08 NOTE — PROGRESS NOTE ADULT - CONSTITUTIONAL DETAILS
well-nourished

## 2020-11-08 NOTE — PROGRESS NOTE ADULT - ASSESSMENT
A/P:  Patient is a 57 yo Italian speaking male with presumed hx of ETOH abuse admitted with     1. Hypothermia 2/2 environmental exposure   2. ETOH withdrawal   3. Hypotension/Shock   4. Rhabdo    Plan:  CCU    Ativan to 2mg q6h  Thiamine for chronic ETOH abuse  PO Diet  Lovenox DVT Prophylaxis    Transfer to a reg floor  Called into the hospitalist at 8:23 AM

## 2020-11-08 NOTE — PROGRESS NOTE ADULT - GASTROINTESTINAL DETAILS
soft/no distention
soft/no distention
no distention/soft
no distention/soft/nontender
no distention/soft/nontender

## 2020-11-09 LAB
ANION GAP SERPL CALC-SCNC: 5 MMOL/L — SIGNIFICANT CHANGE UP (ref 5–17)
BUN SERPL-MCNC: 11 MG/DL — SIGNIFICANT CHANGE UP (ref 7–23)
CALCIUM SERPL-MCNC: 9.2 MG/DL — SIGNIFICANT CHANGE UP (ref 8.5–10.1)
CHLORIDE SERPL-SCNC: 108 MMOL/L — SIGNIFICANT CHANGE UP (ref 96–108)
CO2 SERPL-SCNC: 28 MMOL/L — SIGNIFICANT CHANGE UP (ref 22–31)
CREAT SERPL-MCNC: 0.73 MG/DL — SIGNIFICANT CHANGE UP (ref 0.5–1.3)
GLUCOSE SERPL-MCNC: 131 MG/DL — HIGH (ref 70–99)
HCT VFR BLD CALC: 37.7 % — LOW (ref 39–50)
HGB BLD-MCNC: 12.4 G/DL — LOW (ref 13–17)
MAGNESIUM SERPL-MCNC: 1.7 MG/DL — SIGNIFICANT CHANGE UP (ref 1.6–2.6)
MCHC RBC-ENTMCNC: 32.9 GM/DL — SIGNIFICANT CHANGE UP (ref 32–36)
MCHC RBC-ENTMCNC: 33.5 PG — SIGNIFICANT CHANGE UP (ref 27–34)
MCV RBC AUTO: 101.9 FL — HIGH (ref 80–100)
PHOSPHATE SERPL-MCNC: 3.1 MG/DL — SIGNIFICANT CHANGE UP (ref 2.5–4.5)
PLATELET # BLD AUTO: 235 K/UL — SIGNIFICANT CHANGE UP (ref 150–400)
POTASSIUM SERPL-MCNC: 3.5 MMOL/L — SIGNIFICANT CHANGE UP (ref 3.5–5.3)
POTASSIUM SERPL-SCNC: 3.5 MMOL/L — SIGNIFICANT CHANGE UP (ref 3.5–5.3)
RBC # BLD: 3.7 M/UL — LOW (ref 4.2–5.8)
RBC # FLD: 12.3 % — SIGNIFICANT CHANGE UP (ref 10.3–14.5)
SODIUM SERPL-SCNC: 141 MMOL/L — SIGNIFICANT CHANGE UP (ref 135–145)
WBC # BLD: 5.57 K/UL — SIGNIFICANT CHANGE UP (ref 3.8–10.5)
WBC # FLD AUTO: 5.57 K/UL — SIGNIFICANT CHANGE UP (ref 3.8–10.5)

## 2020-11-09 PROCEDURE — 99232 SBSQ HOSP IP/OBS MODERATE 35: CPT

## 2020-11-09 RX ADMIN — Medication 2 MILLIGRAM(S): at 18:11

## 2020-11-09 RX ADMIN — Medication 2 MILLIGRAM(S): at 12:41

## 2020-11-09 RX ADMIN — Medication 2 MILLIGRAM(S): at 06:10

## 2020-11-09 RX ADMIN — Medication 1 TABLET(S): at 09:31

## 2020-11-09 RX ADMIN — Medication 100 MILLIGRAM(S): at 09:31

## 2020-11-09 RX ADMIN — Medication 1 MILLIGRAM(S): at 09:31

## 2020-11-09 RX ADMIN — ENOXAPARIN SODIUM 40 MILLIGRAM(S): 100 INJECTION SUBCUTANEOUS at 09:31

## 2020-11-09 NOTE — PROGRESS NOTE ADULT - ASSESSMENT
A/P:  Patient is a 57 yo Japanese speaking male with presumed hx of ETOH abuse admitted with     1. Hypothermia 2/2 environmental exposure   2. ETOH withdrawal   3. Hypotension/Shock   4. Rhabdo    P:  Cont Ativan   CIWA protocol  Fall/sz precuations  CPK trending down cont maintenance ivf  No e/o active infection/sepsis at present  Bcx: NGTD. Afebrile  Cont mvi, thiamine folate for suspected etoh related vitamin deficiency  DVT px  IMPROVE VTE Individual Risk Assessment    RISK                                                                Points    [  ] Previous VTE                                                  3    [  ] Thrombophilia                                               2    [  ] Lower limb paralysis                                      2        (unable to hold up >15 seconds)      [  ] Current Cancer                                              2         (within 6 months)    [  ] Immobilization > 24 hrs                                1    [ 1 ] ICU/CCU stay > 24 hours                              1    [ 1 ] Age > 60                                                      1    IMPROVE VTE Score ____2_____-> LMWH    IMPROVE Score 0-1: Low Risk, No VTE prophylaxis required for most patients, encourage ambulation.   IMPROVE Score 2-3: At risk, pharmacologic VTE prophylaxis is indicated for most patients (in the absence of a contraindication)  IMPROVE Score > or = 4: High Risk, pharmacologic VTE prophylaxis is indicated for most patients (in the absence of a contraindication)

## 2020-11-09 NOTE — PROGRESS NOTE ADULT - SUBJECTIVE AND OBJECTIVE BOX
Patient is a 58y old  Male who presents with a chief complaint of Hypothermia, shock (2020 08:20)      SUBJECTIVE:   HPI:  HHCRITICAL, LIGIA is a ~59 yo Singaporean speaking male endorses his name is Dashawn Wills, doesn't know ) with presumed hx of ETOH abuse found outside/down on ground and found to be acutely intoxicated with e/o hypothermia and hypotension. Unclear how long patient was down for. Admitted to ICU for etoh withdrawal and completed course of precedex.     Sub: CIWA 2 on ATC ativan      REVIEW OF SYSTEMS:    CONSTITUTIONAL: No weakness, fevers or chills  EYES/ENT: No visual changes;  No vertigo or throat pain   NECK: No pain or stiffness  RESPIRATORY: No cough, wheezing, hemoptysis; No shortness of breath  CARDIOVASCULAR: No chest pain or palpitations  GASTROINTESTINAL: No abdominal or epigastric pain. No nausea, vomiting, or hematemesis; No diarrhea or constipation. No melena or hematochezia.  GENITOURINARY: No dysuria, frequency or hematuria  NEUROLOGICAL: No numbness or weakness  SKIN: No itching, burning, rashes, or lesions   All other review of systems is negative unless indicated above        ICU Vital Signs Last 24 Hrs  T(C): 36.8 (2020 09:56), Max: 37.5 (2020 06:06)  T(F): 98.2 (2020 09:56), Max: 99.5 (2020 06:06)  HR: 77 (2020 09:56) (60 - 110)  BP: 117/75 (2020 09:56) (101/61 - 133/77)  BP(mean): 89 (2020 17:00) (68 - 89)  ABP: --  ABP(mean): --  RR: 18 (2020 09:56) (14 - 28)  SpO2: 98% (2020 09:56) (97% - 99%)    I&O's Summary      CAPILLARY BLOOD GLUCOSE          PHYSICAL EXAM:    Constitutional: NAD, awake and alert, well-developed  HEENT: PERR, EOMI, Normal Hearing, MMM  Neck: Soft and supple, No LAD, No JVD  Respiratory: Breath sounds are clear bilaterally, No wheezing, rales or rhonchi  Cardiovascular: S1 and S2, regular rate and rhythm, no Murmurs, gallops or rubs  Gastrointestinal: Bowel Sounds present, soft, nontender, nondistended, no guarding, no rebound  Extremities: No peripheral edema  Vascular: 2+ peripheral pulses  Neurological: A/O x 2, no focal deficits  Musculoskeletal: 5/5 strength b/l upper and lower extremities  Skin: No rashes    MEDICATIONS:  MEDICATIONS  (STANDING):  chlorhexidine 4% Liquid 1 Application(s) Topical <User Schedule>  enoxaparin Injectable 40 milliGRAM(s) SubCutaneous daily  folic acid 1 milliGRAM(s) Oral daily  LORazepam   Injectable 2 milliGRAM(s) IV Push every 6 hours  multivitamin 1 Tablet(s) Oral daily  thiamine 100 milliGRAM(s) Oral daily      LABS: All Labs Reviewed:                        12.4   5.57  )-----------( 235      ( 2020 07:26 )             37.7     11-09    141  |  108  |  11  ----------------------------<  131<H>  3.5   |  28  |  0.73    Ca    9.2      2020 07:26  Phos  3.1       Mg     1.7                     Blood Culture:     RADIOLOGY/EKG: reviewed

## 2020-11-10 PROCEDURE — 99232 SBSQ HOSP IP/OBS MODERATE 35: CPT

## 2020-11-10 RX ORDER — FOLIC ACID 0.8 MG
1 TABLET ORAL
Qty: 30 | Refills: 0
Start: 2020-11-10 | End: 2020-12-09

## 2020-11-10 RX ORDER — THIAMINE MONONITRATE (VIT B1) 100 MG
1 TABLET ORAL
Qty: 0 | Refills: 0 | DISCHARGE
Start: 2020-11-10

## 2020-11-10 RX ADMIN — Medication 2 MILLIGRAM(S): at 01:01

## 2020-11-10 RX ADMIN — Medication 100 MILLIGRAM(S): at 09:06

## 2020-11-10 RX ADMIN — ENOXAPARIN SODIUM 40 MILLIGRAM(S): 100 INJECTION SUBCUTANEOUS at 09:06

## 2020-11-10 RX ADMIN — CHLORHEXIDINE GLUCONATE 1 APPLICATION(S): 213 SOLUTION TOPICAL at 08:56

## 2020-11-10 RX ADMIN — Medication 1 MILLIGRAM(S): at 09:06

## 2020-11-10 RX ADMIN — Medication 2 MILLIGRAM(S): at 21:26

## 2020-11-10 RX ADMIN — Medication 1 TABLET(S): at 09:06

## 2020-11-10 RX ADMIN — Medication 2 MILLIGRAM(S): at 14:30

## 2020-11-10 NOTE — DISCHARGE NOTE PROVIDER - CARE PROVIDER_API CALL
Nehemiah Mallory  INTERNAL MEDICINE  [address] [place] [address]  Phone: [phone]  Fax: [phone]  Follow Up Time:

## 2020-11-10 NOTE — PROGRESS NOTE ADULT - ASSESSMENT
A/P:  Patient is a 59 yo Urdu speaking male with presumed hx of ETOH abuse admitted with     1. Hypothermia 2/2 environmental exposure   2. ETOH withdrawal   3. Hypotension/Shock   4. Rhabdo    P:  Cont Ativan - reduce dose to 2mg iv q8H -> anticipate dc to home in next 24-48 hours  DC 1:1  Pt endorses an aunt who can provide shelter upon discharge  CIWA protocol  Fall/sz precuations  No e/o active infection/sepsis at present  Bcx: NGTD. Afebrile  Cont mvi, thiamine folate for suspected etoh related vitamin deficiency  DVT px  IMPROVE VTE Individual Risk Assessment    RISK                                                                Points    [  ] Previous VTE                                                  3    [  ] Thrombophilia                                               2    [  ] Lower limb paralysis                                      2        (unable to hold up >15 seconds)      [  ] Current Cancer                                              2         (within 6 months)    [  ] Immobilization > 24 hrs                                1    [ 1 ] ICU/CCU stay > 24 hours                              1    [ 1 ] Age > 60                                                      1    IMPROVE VTE Score ____2_____-> LMWH    IMPROVE Score 0-1: Low Risk, No VTE prophylaxis required for most patients, encourage ambulation.   IMPROVE Score 2-3: At risk, pharmacologic VTE prophylaxis is indicated for most patients (in the absence of a contraindication)  IMPROVE Score > or = 4: High Risk, pharmacologic VTE prophylaxis is indicated for most patients (in the absence of a contraindication)

## 2020-11-10 NOTE — DISCHARGE NOTE PROVIDER - NSDCCPCAREPLAN_GEN_ALL_CORE_FT
PRINCIPAL DISCHARGE DIAGNOSIS  Diagnosis: Hypothermia, initial encounter  Assessment and Plan of Treatment:       SECONDARY DISCHARGE DIAGNOSES  Diagnosis: Acute alcoholic intoxication with complication  Assessment and Plan of Treatment:

## 2020-11-10 NOTE — DISCHARGE NOTE PROVIDER - NSDCMRMEDTOKEN_GEN_ALL_CORE_FT
folic acid 1 mg oral tablet: 1 tab(s) orally once a day  Multiple Vitamins oral tablet: 1 tab(s) orally once a day  thiamine 100 mg oral tablet: 1 tab(s) orally once a day

## 2020-11-10 NOTE — PROGRESS NOTE ADULT - SUBJECTIVE AND OBJECTIVE BOX
Patient is a 58y old  Male who presents with a chief complaint of Hypothermia, shock (2020 08:20)      SUBJECTIVE:   HPI:  HHCRITICAL, LIGIA is a ~59 yo Bolivian speaking male endorses his name is Dashawn Wills, doesn't know ) with presumed hx of ETOH abuse found outside/down on ground and found to be acutely intoxicated with e/o hypothermia and hypotension. Unclear how long patient was down for. Admitted to ICU for etoh withdrawal and completed course of precedex.     Sub: on high dose ciwa. Pt more alert today      REVIEW OF SYSTEMS:    CONSTITUTIONAL: No weakness, fevers or chills  EYES/ENT: No visual changes;  No vertigo or throat pain   NECK: No pain or stiffness  RESPIRATORY: No cough, wheezing, hemoptysis; No shortness of breath  CARDIOVASCULAR: No chest pain or palpitations  GASTROINTESTINAL: No abdominal or epigastric pain. No nausea, vomiting, or hematemesis; No diarrhea or constipation. No melena or hematochezia.  GENITOURINARY: No dysuria, frequency or hematuria  NEUROLOGICAL: No numbness or weakness  SKIN: No itching, burning, rashes, or lesions   All other review of systems is negative unless indicated above      ICU Vital Signs Last 24 Hrs  T(C): 37.1 (10 Nov 2020 10:26), Max: 37.1 (10 Nov 2020 07:35)  T(F): 98.7 (10 Nov 2020 10:26), Max: 98.8 (10 Nov 2020 07:35)  HR: 72 (10 Nov 2020 10:26) (72 - 100)  BP: 122/75 (10 Nov 2020 10:26) (110/72 - 135/87)  BP(mean): --  ABP: --  ABP(mean): --  RR: 19 (10 Nov 2020 10:26) (18 - 19)  SpO2: 99% (10 Nov 2020 10:26) (96% - 99%)    CAPILLARY BLOOD GLUCOSE          PHYSICAL EXAM:    Constitutional: NAD, awake and alert, well-developed  HEENT: PERR, EOMI, Normal Hearing, MMM  Neck: Soft and supple, No LAD, No JVD  Respiratory: Breath sounds are clear bilaterally, No wheezing, rales or rhonchi  Cardiovascular: S1 and S2, regular rate and rhythm, no Murmurs, gallops or rubs  Gastrointestinal: Bowel Sounds present, soft, nontender, nondistended, no guarding, no rebound  Extremities: No peripheral edema  Vascular: 2+ peripheral pulses  Neurological: A/O x 2, no focal deficits  Musculoskeletal: 5/5 strength b/l upper and lower extremities  Skin: No rashes    MEDICATIONS:  MEDICATIONS  (STANDING):  chlorhexidine 4% Liquid 1 Application(s) Topical <User Schedule>  enoxaparin Injectable 40 milliGRAM(s) SubCutaneous daily  folic acid 1 milliGRAM(s) Oral daily  LORazepam   Injectable 2 milliGRAM(s) IV Push every 6 hours  multivitamin 1 Tablet(s) Oral daily  thiamine 100 milliGRAM(s) Oral daily      LABS: All Labs Reviewed:                        12.4   5.57  )-----------( 235      ( 2020 07:26 )             37.7     11-    141  |  108  |  11  ----------------------------<  131<H>  3.5   |  28  |  0.73    Ca    9.2      2020 07:26  Phos  3.1       Mg     1.7                     Blood Culture:     RADIOLOGY/EKG: reviewed

## 2020-11-10 NOTE — DISCHARGE NOTE PROVIDER - HOSPITAL COURSE
· Subjective and Objective:   Patient is a 58y old  Male who presents with a chief complaint of Hypothermia, shock (2020 08:20)      SUBJECTIVE:   HPI:  LIGIA VERDUGO is a ~57 yo Surinamese speaking male endorses his name is Dashawn Wills, doesn't know ) with presumed hx of ETOH abuse found outside/down on ground and found to be acutely intoxicated with e/o hypothermia and hypotension. Unclear how long patient was down for. Admitted to ICU for etoh withdrawal and completed course of precedex.     Sub: on high dose ciwa. Pt more alert todayA/P:  Patient is a 57 yo Surinamese speaking male with presumed hx of ETOH abuse admitted with       PHYSICAL EXAM:    Constitutional: NAD, awake and alert, well-developed  HEENT: PERR, EOMI, Normal Hearing, MMM  Neck: Soft and supple, No LAD, No JVD  Respiratory: Breath sounds are clear bilaterally, No wheezing, rales or rhonchi  Cardiovascular: S1 and S2, regular rate and rhythm, no Murmurs, gallops or rubs  Gastrointestinal: Bowel Sounds present, soft, nontender, nondistended, no guarding, no rebound  Extremities: No peripheral edema  Vascular: 2+ peripheral pulses  Neurological: A/O x 3, no focal deficits  Musculoskeletal: 5/5 strength b/l upper and lower extremities  Skin: No rashes    Labs/meds reviewed    1. Hypothermia 2/2 environmental exposure   2. ETOH withdrawal   3. Hypotension/Shock   4. Rhabdo    P:  Cont Ativan - reduce dose to 2mg iv q8H -> anticipate dc to home in next 24-48 hours  DC 1:1  Pt endorses an aunt who can provide shelter upon discharge  CIWA protocol  Fall/sz precuations  No e/o active infection/sepsis at present  Bcx: NGTD. Afebrile · Subjective and Objective:   Patient is a 58y old  Male who presents with a chief complaint of Hypothermia, shock (2020 08:20)      57 yo Ivorian speaking male endorses his name is Dashawn Wills, doesn't know ) with presumed hx of ETOH abuse found outside/down on ground and found to be acutely intoxicated with e/o hypothermia and hypotension. Unclear how long patient was down for. Admitted to ICU for etoh withdrawal and completed course of precedex. Now medically stable for discharge.     1. Hypothermia 2/2 environmental exposure   2. ETOH withdrawal   3. Hypotension/Shock   4. Rhabdo

## 2020-11-11 PROCEDURE — 99232 SBSQ HOSP IP/OBS MODERATE 35: CPT

## 2020-11-11 RX ADMIN — Medication 1 MILLIGRAM(S): at 09:59

## 2020-11-11 RX ADMIN — Medication 2 MILLIGRAM(S): at 21:41

## 2020-11-11 RX ADMIN — Medication 2 MILLIGRAM(S): at 05:35

## 2020-11-11 RX ADMIN — Medication 1 TABLET(S): at 09:58

## 2020-11-11 RX ADMIN — ENOXAPARIN SODIUM 40 MILLIGRAM(S): 100 INJECTION SUBCUTANEOUS at 09:58

## 2020-11-11 RX ADMIN — CHLORHEXIDINE GLUCONATE 1 APPLICATION(S): 213 SOLUTION TOPICAL at 10:00

## 2020-11-11 RX ADMIN — Medication 100 MILLIGRAM(S): at 09:58

## 2020-11-11 NOTE — PROGRESS NOTE ADULT - SUBJECTIVE AND OBJECTIVE BOX
Patient is a 58y old  Male who presents with a chief complaint of Hypothermia, shock (2020 08:20)      SUBJECTIVE:   HPI:  HHCRITICAL, LIGIA is a ~59 yo Guyanese speaking male endorses his name is Dashawn Wills, doesn't know ) with presumed hx of ETOH abuse found outside/down on ground and found to be acutely intoxicated with e/o hypothermia and hypotension. Unclear how long patient was down for. Admitted to ICU for etoh withdrawal and completed course of precedex.     Subjuctive:  - pt awake, doing well, no c/o, no o/n events. d/w IDR- awaiting placement         PHYSICAL EXAM:  Vital Signs Last 24 Hrs  T(C): 37.1 (2020 08:13), Max: 37.2 (10 Nov 2020 14:26)  T(F): 98.8 (2020 08:13), Max: 99 (10 Nov 2020 14:26)  HR: 64 (2020 08:13) (59 - 86)  BP: 130/75 (2020 08:13) (105/71 - 130/75)  BP(mean): --  RR: 19 (2020 08:13) (18 - 19)  SpO2: 100% (2020 08:13) (100% - 100%)  Constitutional: NAD, awake and alert, well-developed  HEENT: PERR, EOMI, Normal Hearing, MMM  Neck: Soft and supple, No LAD, No JVD  Respiratory: Breath sounds are clear bilaterally, No wheezing, rales or rhonchi, resp unlabored  Cardiovascular: S1 and S2, regular rate and rhythm, no Murmurs, gallops or rubs  Gastrointestinal: Bowel Sounds present, soft, nontender, nondistended, no guarding, no rebound  Extremities: No peripheral edema  Vascular: 2+ peripheral pulses  Neurological: A/O x 3, no focal deficits  Musculoskeletal: 5/5 strength b/l upper and lower extremities  Skin: No visible rashes    MEDICATIONS:  MEDICATIONS  (STANDING):  chlorhexidine 4% Liquid 1 Application(s) Topical <User Schedule>  enoxaparin Injectable 40 milliGRAM(s) SubCutaneous daily  folic acid 1 milliGRAM(s) Oral daily  LORazepam   Injectable 2 milliGRAM(s) IV Push every 6 hours  multivitamin 1 Tablet(s) Oral daily  thiamine 100 milliGRAM(s) Oral daily      LABS: All Labs Reviewed:  no new labs.    Culture Results:   No Growth Final ( @ 08:34)  Culture Results:   No Growth Final ( @ 08:34)

## 2020-11-11 NOTE — PROGRESS NOTE ADULT - ASSESSMENT
A/P:  Patient is a 59 yo Prydeinig speaking male with presumed hx of ETOH abuse admitted with     1. Hypothermia 2/2 environmental exposure - resolved  2. ETOH withdrawal - resolved  3. Hypotension/Shock - resolved  4. Rhabdo- resolved    P:  off 1:1  Pt endorses an aunt who can provide shelter upon discharge  [facility] protocol  Fall/sz precuations  No e/o active infection/sepsis at present  Bcx: NGTD. Afebrile  Cont mvi, thiamine folate for suspected etoh related vitamin deficiency  pending discharge/placement   DVT px  IMPROVE VTE Individual Risk Assessment    RISK                                                                Points    [  ] Previous VTE                                                  3    [  ] Thrombophilia                                               2    [  ] Lower limb paralysis                                      2        (unable to hold up >15 seconds)      [  ] Current Cancer                                              2         (within 6 months)    [  ] Immobilization > 24 hrs                                1    [ 1 ] ICU/CCU stay > 24 hours                              1    [ 1 ] Age > 60                                                      1    IMPROVE VTE Score ____2_____-> LMWH    IMPROVE Score 0-1: Low Risk, No VTE prophylaxis required for most patients, encourage ambulation.   IMPROVE Score 2-3: At risk, pharmacologic VTE prophylaxis is indicated for most patients (in the absence of a contraindication)  IMPROVE Score > or = 4: High Risk, pharmacologic VTE prophylaxis is indicated for most patients (in the absence of a contraindication)

## 2020-11-12 VITALS — DIASTOLIC BLOOD PRESSURE: 66 MMHG | SYSTOLIC BLOOD PRESSURE: 100 MMHG | HEART RATE: 100 BPM

## 2020-11-12 PROCEDURE — 99238 HOSP IP/OBS DSCHRG MGMT 30/<: CPT

## 2020-11-12 RX ADMIN — Medication 1 MILLIGRAM(S): at 10:37

## 2020-11-12 RX ADMIN — Medication 1 TABLET(S): at 10:37

## 2020-11-12 RX ADMIN — Medication 2 MILLIGRAM(S): at 05:10

## 2020-11-12 RX ADMIN — Medication 100 MILLIGRAM(S): at 10:37

## 2020-11-12 RX ADMIN — ENOXAPARIN SODIUM 40 MILLIGRAM(S): 100 INJECTION SUBCUTANEOUS at 10:36

## 2020-11-12 RX ADMIN — Medication 2 MILLIGRAM(S): at 13:09

## 2020-11-12 NOTE — DISCHARGE NOTE NURSING/CASE MANAGEMENT/SOCIAL WORK - PATIENT PORTAL LINK FT
You can access the FollowMyHealth Patient Portal offered by [facility] by registering at the following website: [email or web address]. By joining [facility]’s FollowMyHealth portal, you will also be able to view your health information using other applications (apps) compatible with our system.

## 2020-11-12 NOTE — PROGRESS NOTE ADULT - ASSESSMENT
A/P:  Patient is a 57 yo Pakistani speaking male with presumed hx of ETOH abuse admitted with     1. Hypothermia 2/2 environmental exposure - resolved  2. ETOH withdrawal - resolved  3. Hypotension/Shock - resolved  4. Rhabdo- resolved    P:  medically stable for dc when placement available  off 1:1  Pt endorses an aunt who can provide shelter upon discharge  [place] protocol  Fall/sz precuations  No e/o active infection/sepsis at present  Bcx: NGTD. Afebrile  Cont mvi, thiamine folate for suspected etoh related vitamin deficiency  pending discharge/placement   DVT px

## 2020-11-12 NOTE — PROGRESS NOTE ADULT - REASON FOR ADMISSION
Hypothermia, shock

## 2020-11-17 DIAGNOSIS — F10.129 ALCOHOL ABUSE WITH INTOXICATION, UNSPECIFIED: ICD-10-CM

## 2020-11-17 DIAGNOSIS — E16.2 HYPOGLYCEMIA, UNSPECIFIED: ICD-10-CM
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
## 2020-11-17 DIAGNOSIS — M62.82 RHABDOMYOLYSIS: ICD-10-CM

## 2020-11-17 DIAGNOSIS — F10.139 ALCOHOL ABUSE WITH WITHDRAWAL, UNSPECIFIED: ICD-10-CM

## 2020-11-17 DIAGNOSIS — T68.XXXA HYPOTHERMIA, INITIAL ENCOUNTER: ICD-10-CM
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
## 2020-11-17 DIAGNOSIS — E51.9 THIAMINE DEFICIENCY, UNSPECIFIED: ICD-10-CM

## 2020-11-17 DIAGNOSIS — D72.819 DECREASED WHITE BLOOD CELL COUNT, UNSPECIFIED: ICD-10-CM
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
## 2020-11-17 DIAGNOSIS — R57.9 SHOCK, UNSPECIFIED: ICD-10-CM
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
## 2020-11-17 DIAGNOSIS — I95.9 HYPOTENSION, UNSPECIFIED: ICD-10-CM

## 2020-11-23 ENCOUNTER — INPATIENT (INPATIENT)
Facility: HOSPITAL | Age: 40
LOS: 7 days | Discharge: HOME | DRG: 951 | End: 2020-12-01
Attending: INTERNAL MEDICINE | Admitting: SURGERY
Payer: MEDICAID

## 2020-11-23 VITALS
RESPIRATION RATE: 18 BRPM | OXYGEN SATURATION: 96 % | SYSTOLIC BLOOD PRESSURE: 110 MMHG | HEIGHT: 67 IN | DIASTOLIC BLOOD PRESSURE: 54 MMHG | HEART RATE: 72 BPM | WEIGHT: 199.96 LBS

## 2020-11-23 DIAGNOSIS — T68.XXXA HYPOTHERMIA, INITIAL ENCOUNTER: ICD-10-CM

## 2020-11-23 LAB
ALBUMIN SERPL ELPH-MCNC: 3.3 G/DL — SIGNIFICANT CHANGE UP (ref 3.3–5)
ALP SERPL-CCNC: 144 U/L — HIGH (ref 40–120)
ALT FLD-CCNC: 25 U/L — SIGNIFICANT CHANGE UP (ref 12–78)
AMMONIA BLD-MCNC: 28 UMOL/L — SIGNIFICANT CHANGE UP (ref 11–32)
ANION GAP SERPL CALC-SCNC: 8 MMOL/L — SIGNIFICANT CHANGE UP (ref 5–17)
APPEARANCE UR: CLEAR — SIGNIFICANT CHANGE UP
AST SERPL-CCNC: 40 U/L — HIGH (ref 15–37)
BASE EXCESS BLDA CALC-SCNC: -1.3 MMOL/L — SIGNIFICANT CHANGE UP (ref -2–2)
BASE EXCESS BLDV CALC-SCNC: -0.1 MMOL/L — SIGNIFICANT CHANGE UP (ref -2–2)
BASOPHILS # BLD AUTO: 0.05 K/UL — SIGNIFICANT CHANGE UP (ref 0–0.2)
BASOPHILS NFR BLD AUTO: 0.7 % — SIGNIFICANT CHANGE UP (ref 0–2)
BILIRUB SERPL-MCNC: 0.2 MG/DL — SIGNIFICANT CHANGE UP (ref 0.2–1.2)
BILIRUB UR-MCNC: NEGATIVE — SIGNIFICANT CHANGE UP
BLOOD GAS COMMENTS ARTERIAL: SIGNIFICANT CHANGE UP
BUN SERPL-MCNC: 7 MG/DL — SIGNIFICANT CHANGE UP (ref 7–23)
CALCIUM SERPL-MCNC: 8.4 MG/DL — LOW (ref 8.5–10.1)
CHLORIDE SERPL-SCNC: 110 MMOL/L — HIGH (ref 96–108)
CK SERPL-CCNC: 206 U/L — SIGNIFICANT CHANGE UP (ref 26–308)
CO2 SERPL-SCNC: 30 MMOL/L — SIGNIFICANT CHANGE UP (ref 22–31)
COLOR SPEC: YELLOW — SIGNIFICANT CHANGE UP
CREAT SERPL-MCNC: 0.4 MG/DL — LOW (ref 0.5–1.3)
DIFF PNL FLD: ABNORMAL
EOSINOPHIL # BLD AUTO: 0.02 K/UL — SIGNIFICANT CHANGE UP (ref 0–0.5)
EOSINOPHIL NFR BLD AUTO: 0.3 % — SIGNIFICANT CHANGE UP (ref 0–6)
ETHANOL SERPL-MCNC: 511 MG/DL — HIGH (ref 0–10)
GAS PNL BLDA: SIGNIFICANT CHANGE UP
GLUCOSE SERPL-MCNC: 145 MG/DL — HIGH (ref 70–99)
GLUCOSE UR QL: NEGATIVE MG/DL — SIGNIFICANT CHANGE UP
HCO3 BLDA-SCNC: 25 MMOL/L — SIGNIFICANT CHANGE UP (ref 21–29)
HCO3 BLDV-SCNC: 30 MMOL/L — HIGH (ref 21–29)
HCT VFR BLD CALC: 42.1 % — SIGNIFICANT CHANGE UP (ref 39–50)
HGB BLD-MCNC: 14.1 G/DL — SIGNIFICANT CHANGE UP (ref 13–17)
IMM GRANULOCYTES NFR BLD AUTO: 0.3 % — SIGNIFICANT CHANGE UP (ref 0–1.5)
KETONES UR-MCNC: NEGATIVE — SIGNIFICANT CHANGE UP
LACTATE SERPL-SCNC: 3.5 MMOL/L — HIGH (ref 0.7–2)
LEUKOCYTE ESTERASE UR-ACNC: NEGATIVE — SIGNIFICANT CHANGE UP
LYMPHOCYTES # BLD AUTO: 2.54 K/UL — SIGNIFICANT CHANGE UP (ref 1–3.3)
LYMPHOCYTES # BLD AUTO: 33.4 % — SIGNIFICANT CHANGE UP (ref 13–44)
MAGNESIUM SERPL-MCNC: 1.7 MG/DL — SIGNIFICANT CHANGE UP (ref 1.6–2.6)
MCHC RBC-ENTMCNC: 33.3 PG — SIGNIFICANT CHANGE UP (ref 27–34)
MCHC RBC-ENTMCNC: 33.5 GM/DL — SIGNIFICANT CHANGE UP (ref 32–36)
MCV RBC AUTO: 99.3 FL — SIGNIFICANT CHANGE UP (ref 80–100)
MONOCYTES # BLD AUTO: 0.21 K/UL — SIGNIFICANT CHANGE UP (ref 0–0.9)
MONOCYTES NFR BLD AUTO: 2.8 % — SIGNIFICANT CHANGE UP (ref 2–14)
NEUTROPHILS # BLD AUTO: 4.77 K/UL — SIGNIFICANT CHANGE UP (ref 1.8–7.4)
NEUTROPHILS NFR BLD AUTO: 62.5 % — SIGNIFICANT CHANGE UP (ref 43–77)
NITRITE UR-MCNC: NEGATIVE — SIGNIFICANT CHANGE UP
PCO2 BLDA: 51 MMHG — HIGH (ref 32–46)
PCO2 BLDV: 81 MMHG — HIGH (ref 35–50)
PH BLDA: 7.31 — LOW (ref 7.35–7.45)
PH BLDV: 7.2 — LOW (ref 7.35–7.45)
PH UR: 6 — SIGNIFICANT CHANGE UP (ref 5–8)
PHOSPHATE SERPL-MCNC: 3.5 MG/DL — SIGNIFICANT CHANGE UP (ref 2.5–4.5)
PLATELET # BLD AUTO: 342 K/UL — SIGNIFICANT CHANGE UP (ref 150–400)
PO2 BLDA: 180 MMHG — HIGH (ref 74–108)
PO2 BLDV: 58 MMHG — HIGH (ref 25–45)
POTASSIUM SERPL-MCNC: 3.2 MMOL/L — LOW (ref 3.5–5.3)
POTASSIUM SERPL-SCNC: 3.2 MMOL/L — LOW (ref 3.5–5.3)
PROT SERPL-MCNC: 7.9 GM/DL — SIGNIFICANT CHANGE UP (ref 6–8.3)
PROT UR-MCNC: 30 MG/DL
RBC # BLD: 4.24 M/UL — SIGNIFICANT CHANGE UP (ref 4.2–5.8)
RBC # FLD: 12.7 % — SIGNIFICANT CHANGE UP (ref 10.3–14.5)
SAO2 % BLDA: 98 % — HIGH (ref 92–96)
SAO2 % BLDV: 75 % — SIGNIFICANT CHANGE UP (ref 67–88)
SARS-COV-2 RNA SPEC QL NAA+PROBE: SIGNIFICANT CHANGE UP
SODIUM SERPL-SCNC: 148 MMOL/L — HIGH (ref 135–145)
SP GR SPEC: 1.01 — SIGNIFICANT CHANGE UP (ref 1.01–1.02)
TROPONIN I SERPL-MCNC: <0.015 NG/ML — SIGNIFICANT CHANGE UP (ref 0.01–0.04)
UROBILINOGEN FLD QL: NEGATIVE MG/DL — SIGNIFICANT CHANGE UP
WBC # BLD: 7.61 K/UL — SIGNIFICANT CHANGE UP (ref 3.8–10.5)
WBC # FLD AUTO: 7.61 K/UL — SIGNIFICANT CHANGE UP (ref 3.8–10.5)

## 2020-11-23 PROCEDURE — 86769 SARS-COV-2 COVID-19 ANTIBODY: CPT

## 2020-11-23 PROCEDURE — 82803 BLOOD GASES ANY COMBINATION: CPT

## 2020-11-23 PROCEDURE — 87449 NOS EACH ORGANISM AG IA: CPT

## 2020-11-23 PROCEDURE — 82962 GLUCOSE BLOOD TEST: CPT

## 2020-11-23 PROCEDURE — 83605 ASSAY OF LACTIC ACID: CPT

## 2020-11-23 PROCEDURE — 36415 COLL VENOUS BLD VENIPUNCTURE: CPT

## 2020-11-23 PROCEDURE — 0225U NFCT DS DNA&RNA 21 SARSCOV2: CPT

## 2020-11-23 PROCEDURE — 85027 COMPLETE CBC AUTOMATED: CPT

## 2020-11-23 PROCEDURE — 84443 ASSAY THYROID STIM HORMONE: CPT

## 2020-11-23 PROCEDURE — 87070 CULTURE OTHR SPECIMN AEROBIC: CPT

## 2020-11-23 PROCEDURE — 84145 PROCALCITONIN (PCT): CPT

## 2020-11-23 PROCEDURE — 73560 X-RAY EXAM OF KNEE 1 OR 2: CPT | Mod: LT

## 2020-11-23 PROCEDURE — 84100 ASSAY OF PHOSPHORUS: CPT

## 2020-11-23 PROCEDURE — 87450: CPT

## 2020-11-23 PROCEDURE — 70450 CT HEAD/BRAIN W/O DYE: CPT | Mod: 26

## 2020-11-23 PROCEDURE — 36600 WITHDRAWAL OF ARTERIAL BLOOD: CPT

## 2020-11-23 PROCEDURE — 80048 BASIC METABOLIC PNL TOTAL CA: CPT

## 2020-11-23 PROCEDURE — 71045 X-RAY EXAM CHEST 1 VIEW: CPT

## 2020-11-23 PROCEDURE — 85025 COMPLETE CBC W/AUTO DIFF WBC: CPT

## 2020-11-23 PROCEDURE — 94003 VENT MGMT INPAT SUBQ DAY: CPT

## 2020-11-23 PROCEDURE — 71045 X-RAY EXAM CHEST 1 VIEW: CPT | Mod: 26,76

## 2020-11-23 PROCEDURE — 83735 ASSAY OF MAGNESIUM: CPT

## 2020-11-23 PROCEDURE — 87641 MR-STAPH DNA AMP PROBE: CPT

## 2020-11-23 PROCEDURE — 97163 PT EVAL HIGH COMPLEX 45 MIN: CPT | Mod: GP

## 2020-11-23 PROCEDURE — 93010 ELECTROCARDIOGRAM REPORT: CPT

## 2020-11-23 PROCEDURE — 80053 COMPREHEN METABOLIC PANEL: CPT

## 2020-11-23 PROCEDURE — C9113: CPT

## 2020-11-23 PROCEDURE — 87640 STAPH A DNA AMP PROBE: CPT

## 2020-11-23 PROCEDURE — 97116 GAIT TRAINING THERAPY: CPT | Mod: GP

## 2020-11-23 RX ORDER — SODIUM CHLORIDE 9 MG/ML
2000 INJECTION INTRAMUSCULAR; INTRAVENOUS; SUBCUTANEOUS ONCE
Refills: 0 | Status: COMPLETED | OUTPATIENT
Start: 2020-11-23 | End: 2020-11-23

## 2020-11-23 RX ORDER — MAGNESIUM SULFATE 500 MG/ML
2 VIAL (ML) INJECTION ONCE
Refills: 0 | Status: COMPLETED | OUTPATIENT
Start: 2020-11-23 | End: 2020-11-23

## 2020-11-23 RX ORDER — ROCURONIUM BROMIDE 10 MG/ML
50 VIAL (ML) INTRAVENOUS ONCE
Refills: 0 | Status: COMPLETED | OUTPATIENT
Start: 2020-11-23 | End: 2020-11-23

## 2020-11-23 RX ORDER — POTASSIUM CHLORIDE 20 MEQ
10 PACKET (EA) ORAL
Refills: 0 | Status: COMPLETED | OUTPATIENT
Start: 2020-11-23 | End: 2020-11-23

## 2020-11-23 RX ORDER — THIAMINE MONONITRATE (VIT B1) 100 MG
100 TABLET ORAL DAILY
Refills: 0 | Status: DISCONTINUED | OUTPATIENT
Start: 2020-11-23 | End: 2020-11-23

## 2020-11-23 RX ORDER — SODIUM CHLORIDE 9 MG/ML
1000 INJECTION, SOLUTION INTRAVENOUS
Refills: 0 | Status: DISCONTINUED | OUTPATIENT
Start: 2020-11-23 | End: 2020-11-25

## 2020-11-23 RX ORDER — THIAMINE MONONITRATE (VIT B1) 100 MG
100 TABLET ORAL DAILY
Refills: 0 | Status: DISCONTINUED | OUTPATIENT
Start: 2020-11-23 | End: 2020-11-28

## 2020-11-23 RX ORDER — SODIUM CHLORIDE 9 MG/ML
1000 INJECTION, SOLUTION INTRAVENOUS ONCE
Refills: 0 | Status: COMPLETED | OUTPATIENT
Start: 2020-11-23 | End: 2020-11-23

## 2020-11-23 RX ORDER — CHLORHEXIDINE GLUCONATE 213 G/1000ML
15 SOLUTION TOPICAL EVERY 12 HOURS
Refills: 0 | Status: DISCONTINUED | OUTPATIENT
Start: 2020-11-23 | End: 2020-11-27

## 2020-11-23 RX ORDER — ETOMIDATE 2 MG/ML
20 INJECTION INTRAVENOUS ONCE
Refills: 0 | Status: COMPLETED | OUTPATIENT
Start: 2020-11-23 | End: 2020-11-23

## 2020-11-23 RX ORDER — PANTOPRAZOLE SODIUM 20 MG/1
40 TABLET, DELAYED RELEASE ORAL DAILY
Refills: 0 | Status: DISCONTINUED | OUTPATIENT
Start: 2020-11-23 | End: 2020-11-28

## 2020-11-23 RX ORDER — CEFTRIAXONE 500 MG/1
1000 INJECTION, POWDER, FOR SOLUTION INTRAMUSCULAR; INTRAVENOUS ONCE
Refills: 0 | Status: COMPLETED | OUTPATIENT
Start: 2020-11-23 | End: 2020-11-23

## 2020-11-23 RX ORDER — PROPOFOL 10 MG/ML
20 INJECTION, EMULSION INTRAVENOUS
Qty: 500 | Refills: 0 | Status: DISCONTINUED | OUTPATIENT
Start: 2020-11-23 | End: 2020-11-24

## 2020-11-23 RX ORDER — ENOXAPARIN SODIUM 100 MG/ML
40 INJECTION SUBCUTANEOUS ONCE
Refills: 0 | Status: COMPLETED | OUTPATIENT
Start: 2020-11-23 | End: 2020-11-23

## 2020-11-23 RX ORDER — CHLORHEXIDINE GLUCONATE 213 G/1000ML
1 SOLUTION TOPICAL
Refills: 0 | Status: DISCONTINUED | OUTPATIENT
Start: 2020-11-23 | End: 2020-12-01

## 2020-11-23 RX ADMIN — ETOMIDATE 20 MILLIGRAM(S): 2 INJECTION INTRAVENOUS at 19:55

## 2020-11-23 RX ADMIN — SODIUM CHLORIDE 1000 MILLILITER(S): 9 INJECTION, SOLUTION INTRAVENOUS at 19:24

## 2020-11-23 RX ADMIN — Medication 100 MILLIEQUIVALENT(S): at 21:40

## 2020-11-23 RX ADMIN — SODIUM CHLORIDE 2000 MILLILITER(S): 9 INJECTION INTRAMUSCULAR; INTRAVENOUS; SUBCUTANEOUS at 21:22

## 2020-11-23 RX ADMIN — ENOXAPARIN SODIUM 40 MILLIGRAM(S): 100 INJECTION SUBCUTANEOUS at 20:58

## 2020-11-23 RX ADMIN — CEFTRIAXONE 1000 MILLIGRAM(S): 500 INJECTION, POWDER, FOR SOLUTION INTRAMUSCULAR; INTRAVENOUS at 19:47

## 2020-11-23 RX ADMIN — Medication 100 MILLIEQUIVALENT(S): at 22:33

## 2020-11-23 RX ADMIN — SODIUM CHLORIDE 125 MILLILITER(S): 9 INJECTION, SOLUTION INTRAVENOUS at 21:40

## 2020-11-23 RX ADMIN — Medication 101 MILLIGRAM(S): at 21:40

## 2020-11-23 RX ADMIN — SODIUM CHLORIDE 1000 MILLILITER(S): 9 INJECTION INTRAMUSCULAR; INTRAVENOUS; SUBCUTANEOUS at 18:38

## 2020-11-23 RX ADMIN — Medication 100 MILLIEQUIVALENT(S): at 20:25

## 2020-11-23 RX ADMIN — Medication 50 MILLIGRAM(S): at 19:56

## 2020-11-23 RX ADMIN — PROPOFOL 10.9 MICROGRAM(S)/KG/MIN: 10 INJECTION, EMULSION INTRAVENOUS at 21:41

## 2020-11-23 RX ADMIN — Medication 50 GRAM(S): at 20:55

## 2020-11-23 RX ADMIN — SODIUM CHLORIDE 1000 MILLILITER(S): 9 INJECTION, SOLUTION INTRAVENOUS at 21:30

## 2020-11-23 NOTE — ED ADULT NURSE REASSESSMENT NOTE - NS ED NURSE REASSESS COMMENT FT1
pt intubated at 1957, 7.5 tube 21@lip, +color change.   16Fr OG tube inserted at 2020 with xray confirming placement.

## 2020-11-23 NOTE — ED PROVIDER NOTE - OBJECTIVE STATEMENT
41 y/o M with PMHx of ETOH abuse presents to the ED BIBEMS intoxicated s/p being found behind [employer]. Pt c/o feeling cold, no other complaints. Was recently admitted to [facility] for hypothermia from 10/31-11/12. NKDA. Pt vannessa historian 2/2 intox.

## 2020-11-23 NOTE — H&P ADULT - HISTORY OF PRESENT ILLNESS
40M with PMHx of EtOH abuse with admission in past for EtOH intoxication/withdrawal who presented to ED BIBEMS after being found on ground intoxicated behind [employer]. Found to be hypothermic to 80s, BAL>500. Pt with progressive AMS. VBG revealed elevated pCO2. Pt unable to effectively protect airway and was intubated in ED. ICU consulted for evaluation and further management.

## 2020-11-23 NOTE — ED PROVIDER NOTE - CLINICAL SUMMARY MEDICAL DECISION MAKING FREE TEXT BOX
Pt with hypothermia 2/2 exposure, alcohol intoxication, hypercarbia.  Intubated for airway protection/hypercarbia, active rewarming with warm fluids and warming blanket.  Lactic acidosis unlikely infection, but covered with rocephin for now.   Admit to ICU.

## 2020-11-23 NOTE — ED ADULT NURSE NOTE - OBJECTIVE STATEMENT
39 y/o BIBEMS intoxicated s/p found behind [employer] PMHx of ETOH abuse presents to the ED BIBEMS intoxicated s/p being found behind [employer]. Pt c/o feeling cold, no other complaints. Was recently admitted to [facility] for hypothermia from 10/31-11/12. NKDA. Pt vannessa historian 2/2 intox. 39 y/o BIBEMS appears intoxicated s/p found behind [employer] [address] soiled/wet/cold. PMHx of ETOH, pt is a poor historian. Placed on cardiac monitor, f/c in place as ordered with temperature monitoring, Unable to obtain oral or rectal temperature, BEHR in place as ordered and tolerated well after cornelius temp found at 84f, dr. Worthy aware. Unable to obtain IV access, Dr. Worthy accessed via [place].

## 2020-11-23 NOTE — H&P ADULT - NSHPLABSRESULTS_GEN_ALL_CORE
ABG - ( 2020 22:11 )  pH, Arterial: 7.31  pH, Blood: x     /  pCO2: 51    /  pO2: 180   / HCO3: 25    / Base Excess: -1.3  /  SaO2: 98      LABS:                        14.1   7.61  )-----------( 342      ( 2020 18:26 )             42.1         148<H>  |  110<H>  |  7   ----------------------------<  145<H>  3.2<L>   |  30  |  0.40<L>    Ca    8.4<L>      2020 18:26  Phos  3.5       Mg     1.7         TPro  7.9  /  Alb  3.3  /  TBili  0.2  /  DBili  x   /  AST  40<H>  /  ALT  25  /  AlkPhos  144<H>        CARDIAC MARKERS ( 2020 18:26 )  <0.015 ng/mL / x     / 206 U/L / x     / x        POCT Blood Glucose.: 142 mg/dL (2020 18:10)    Urinalysis Basic - ( 2020 18:56 )    Color: Yellow / Appearance: Clear / S.015 / pH: x  Gluc: x / Ketone: Negative  / Bili: Negative / Urobili: Negative mg/dL   Blood: x / Protein: 30 mg/dL / Nitrite: Negative   Leuk Esterase: Negative / RBC: 0-2 /HPF / WBC Negative   Sq Epi: x / Non Sq Epi: Negative / Bacteria: Negative      CULTURES:pending    RADIOLOGY:   EXAM:  XR CHEST PORTABLE URGENT 1V                            PROCEDURE DATE:  2020      INTERPRETATION:  Portable chest radiograph    CLINICAL INFORMATION:   Post intubation chest radiograph    TECHNIQUE:  Portable  AP view of the chest was obtained.    COMPARISON: 2020 available for review.    FINDINGS:    ET tube tip above tracheal bifurcation.  NG tube tip beyond GE junction.    The lungs are clear of airspace consolidations or effusions. No pneumothorax.    The heart and mediastinum are within normal limits.    Visualized osseous structures are intact.    IMPRESSION:   No evidence of active chest disease.    ET tube tip above tracheal bifurcation.  NG tube tip beyond GE junction.      MICHAEL MENCHACA MD; Attending Radiologist  This document has been electronically signed. 2020  9:04PM    EXAM:  CT BRAIN                            PROCEDURE DATE:  2020          INTERPRETATION:  CLINICAL INDICATION: ams    Technique: Noncontrast CT of the head was performed.  IMPRESSION:    Volume loss with microvascular disease, no acute hemorrhage or midline shift. If symptoms persist consider follow-up head CT or MR if no contraindications            JOHANNE GUERRIER MD, Attending Radiologist  This document has been electronically signed. 2020  7:26PM

## 2020-11-23 NOTE — CONSULT NOTE ADULT - ASSESSMENT
Pt is a 39 yo HM with h/o ETOH abuse admitted 10/31-11/12 2 to hypothermia (was found outside), hypotension and ETOH withdrawal. Today pt was BIBEMS intoxicated, feeling cold s/p being found behind [employer]. In the ER pt with stable BP but temp was 83 F. Pt with the following abnormal labs pH 7.2 pCO2 81. ICU admitting dx: 1) Hypothermia 2 to exposure 2) ETOH intoxication 3) Hypercapneic resp failure 4) Hypokalemia    Resp:Trial of BiPAP with close observation and low threshold to intubate  ID: PanCx/ Doubt pt is septic but low threshold to start Abx  CVS: Currently hemodynamically stable/ Trend lactate  FEN: NPO/Warm IVF/ Replace K: pt hypokalemic/ Daily Thiamine, MVI and Folate   Neuro/Psych: PT is acutely intoxicated does not need to be Rx'd with Benzo or Phenobarb  Pt needs to be actively warmed  Definitive plan as per ICU team

## 2020-11-23 NOTE — H&P ADULT - NSHPPHYSICALEXAM_GEN_ALL_CORE
Mode: AC/ CMV (Assist Control/ Continuous Mandatory Ventilation)  RR (machine): 20  TV (machine): 400  FiO2: 40  PEEP: 5  ITime: 1  MAP: 13    ICU Vital Signs Last 24 Hrs  T(C): 34.1 (23 Nov 2020 22:41), Max: 34.1 (23 Nov 2020 22:41)  T(F): 93.3 (23 Nov 2020 22:41), Max: 93.3 (23 Nov 2020 22:41)  HR: 91 (23 Nov 2020 22:41) (67 - 99)  BP: 99/66 (23 Nov 2020 22:41) (99/66 - 182/142)  BP(mean): --  ABP: --  ABP(mean): --  RR: 23 (23 Nov 2020 22:41) (16 - 25)  SpO2: 100% (23 Nov 2020 22:41) (96% - 100%)      Physical Examination:    General: No acute distress.  Alert, oriented, interactive, nonfocal    HEENT: Pupils equal, reactive to light.  Symmetric.    PULM: Clear to auscultation bilaterally, no significant sputum production    CVS: Regular rate and rhythm, no murmurs, rubs, or gallops    ABD: Soft, nondistended, nontender, normoactive bowel sounds, no masses    EXT: No edema, nontender    SKIN: Warm and well perfused, no rashes noted.

## 2020-11-23 NOTE — ED PROVIDER NOTE - PROGRESS NOTE DETAILS
Scribharshil DAVILA for ED attending, Dr. Worthy: Pt's temp not registering by oral or rectal means, cold to touch, suspect profound hypothermia 2/2 environmental exposure. Pt currently hemodynamically stable, satting 96% on RA, not tachycardic, and appears drunk. Will start warm fluids, warming blanket, labs, imaging, and admit. Osvaldo DAVILA for ED attending, Dr. Worthy: Pt with elevated lactate of 3.5, hypothermic to 83 F likely 2/2 exposure. Getting warmed IV fluids, on bear hugger, and will give Rocephin to cover for possible infection however this is unlikely. Pt with hypercarbia likely 2/2 ETOH use and hypothermia, will place on BiPAP if tolerated. Pt also hypokalemic, will give IV K+. Will consult ICU. Osvaldo DAVILA for ED attending, Dr. Worthy: Spoke with tele ICU physician, pt will be admitted to ICU and ICU PA will come to see pt. Will trial BiPAP and if BiPAP fails, will intubate.

## 2020-11-23 NOTE — H&P ADULT - ASSESSMENT
Impression:  1. acute hypercapnic respiratory failure  2. hypothermia secondary to element exposure  3. alcohol intoxication  4. AMS  secondary to alcohol intoxication and CO2 narcosis      Plan:  Neuro - holding sedation until MS improves, add thiamine standing, will initiate CIWA protocol once begins to wake, CTH negative, ammonia normal    CV -  hemodynamically stable    Pulm -  full vent support with LTV 4-8cc/kg IDW             placed on increased RR to improve MV with goal for CO2 correction             repeat ABG shows improvement             CXR WAD             utilization of PEEP for alveolar recruitment if desats             Vent bundle added    GI -  PPI  Enteric feeds in am if unable to begin weaning off vent    Renal - Cr stable, addition of IV hydration with balanced fluids and dextrose for prevention of starvation ketosis, monitor I/Os    Heme -  Pharmacologic DVT PPx  in addition to SCD's    ID - no indication for Abx as this time, hypothermia most likely element exposure, warming blanket, monitor closely off abx, f/u cxs      Endo -  no active issues currently.      CRITICAL CARE TIME SPENT: 55 mins assessing presenting problems of acute illness that poses high probability of life threatening deterioration or end organ damage/dysfunction.  Medical decision making including Initiating plan of care, reviewing data, reviewing radiology, direct patient bedside evaluation and interpretation of vital signs, any necessary ventilator management , discussion with multidisciplinary team, all non inclusive of procedures

## 2020-11-23 NOTE — ED ADULT NURSE NOTE - NSFALLRSKINDICTYPE_ED_ALL_ED
Disorientation/Confusion/Intoxication/Altered Elimination/Impaired Gait/Need for Mobility Assisted Device

## 2020-11-23 NOTE — ED ADULT TRIAGE NOTE - CHIEF COMPLAINT QUOTE
Pt arrived with EMS after being found sleeping [address], + AOB, ETOH no obvious bleeding, swelling ore deformity, unable to obtain CC due to intoxication. MD Worthy aware of pt status.

## 2020-11-23 NOTE — ED ADULT NURSE NOTE - NSIMPLEMENTINTERV_GEN_ALL_ED
Implemented All Fall Risk Interventions:  [place] to call system. Call bell, personal items and telephone within reach. Instruct patient to call for assistance. Room bathroom lighting operational. Non-slip footwear when patient is off stretcher. Physically safe environment: no spills, clutter or unnecessary equipment. Stretcher in lowest position, wheels locked, appropriate side rails in place. Provide visual cue, wrist band, yellow gown, etc. Monitor gait and stability. Monitor for mental status changes and reorient to person, place, and time. Review medications for side effects contributing to fall risk. Reinforce activity limits and safety measures with patient and family.

## 2020-11-23 NOTE — CONSULT NOTE ADULT - SUBJECTIVE AND OBJECTIVE BOX
REASON FOR CONSULT: ***    Chief Complaint: Feeling cold    HPI: Pt is a 41 yo HM with h/o ETOH abuse admitted 10/31- 2 to hypothermia (was found outside), hypotension and ETOH withdrawal. Today pt was BIBEMS intoxicated, feeling cold s/p being found behind [employer]. In the ER pt with stable BP but temp was 83 F. Pt with the following abnormal labs pH 7.2 pCO2 81     No significant past surgical history: Unknown      Allergies Unkown    No Known Allergies    Intolerances      FAMILY HISTORY: Unknown      Review of Systems:  CONSTITUTIONAL: No fever, chills, or fatigue  EYES: No eye pain, visual disturbances, or discharge  ENMT:  No difficulty hearing, tinnitus, vertigo; No sinus or throat pain  NECK: No pain or stiffness  RESPIRATORY: No cough, wheezing, chills or hemoptysis; No shortness of breath  CARDIOVASCULAR: No chest pain, palpitations, dizziness, or leg swelling  GASTROINTESTINAL: No abdominal or epigastric pain. No nausea, vomiting, or hematemesis; No diarrhea or constipation. No melena or hematochezia.  GENITOURINARY: No dysuria, frequency, hematuria, or incontinence  NEUROLOGICAL: No headaches, memory loss, loss of strength, numbness, or tremors  SKIN: No itching, burning, rashes, or lesions   MUSCULOSKELETAL: No joint pain or swelling; No muscle, back, or extremity pain  PSYCHIATRIC: No depression, anxiety, mood swings, or difficulty sleeping      Medications:                    potassium chloride  10 mEq/100 mL IVPB 10 milliEquivalent(s) IV Intermittent every 1 hour                ICU Vital Signs Last 24 Hrs  T(C): 29.1 (2020 18:30), Max: 29.1 (2020 18:30)  T(F): 84.3 (2020 18:30), Max: 84.3 (2020 18:30)  HR: 72 (2020 17:48) (72 - 72)  BP: 110/54 (2020 17:48) (110/54 - 110/54)  BP(mean): --  ABP: --  ABP(mean): --  RR: 18 (2020 17:48) (18 - 18)  SpO2: 96% (2020 17:48) (96% - 96%)    Vital Signs Last 24 Hrs  T(C): 29.1 (2020 18:30), Max: 29.1 (2020 18:30)  T(F): 84.3 (2020 18:30), Max: 84.3 (2020 18:30)  HR: 72 (2020 17:48) (72 - 72)  BP: 110/54 (2020 17:48) (110/54 - 110/54)  BP(mean): --  RR: 18 (2020 17:48) (18 - 18)  SpO2: 96% (2020 17:48) (96% - 96%)        I&O's Detail        LABS:                        14.1   7.61  )-----------( 342      ( 2020 18:26 )             42.1     11    148<H>  |  110<H>  |  7   ----------------------------<  145<H>  3.2<L>   |  30  |  0.40<L>    Ca    8.4<L>      2020 18:26  Phos  3.5     11  Mg     1.7         TPro  7.9  /  Alb  3.3  /  TBili  0.2  /  DBili  x   /  AST  40<H>  /  ALT  25  /  AlkPhos  144<H>  1123      CARDIAC MARKERS ( 2020 18:26 )  <0.015 ng/mL / x     / 206 U/L / x     / x          CAPILLARY BLOOD GLUCOSE      POCT Blood Glucose.: 142 mg/dL (2020 18:10)      Urinalysis Basic - ( 2020 18:56 )    Color: Yellow / Appearance: Clear / S.015 / pH: x  Gluc: x / Ketone: Negative  / Bili: Negative / Urobili: Negative mg/dL   Blood: x / Protein: 30 mg/dL / Nitrite: Negative   Leuk Esterase: Negative / RBC: 0-2 /HPF / WBC Negative   Sq Epi: x / Non Sq Epi: Negative / Bacteria: Negative      CULTURES:      Physical Examination:  Physical exam as per bedside MD (direct physical examination could not be performed because the visit was provided via Telemedicine):       RADIOLOGY: ***  CXR: CRESENCIO    CT head: No acute pathology      CRITICAL CARE TIME SPENT: ***    This visit was provided via telemedicine. Patient was located at     [facility] ED. [address] [place] [address]  Provider was located at [facility] [place] for the visit.

## 2020-11-24 LAB
ANION GAP SERPL CALC-SCNC: 9 MMOL/L — SIGNIFICANT CHANGE UP (ref 5–17)
BUN SERPL-MCNC: 5 MG/DL — LOW (ref 7–23)
CALCIUM SERPL-MCNC: 7.5 MG/DL — LOW (ref 8.5–10.1)
CHLORIDE SERPL-SCNC: 114 MMOL/L — HIGH (ref 96–108)
CO2 SERPL-SCNC: 26 MMOL/L — SIGNIFICANT CHANGE UP (ref 22–31)
CREAT SERPL-MCNC: 0.47 MG/DL — LOW (ref 0.5–1.3)
CULTURE RESULTS: NO GROWTH — SIGNIFICANT CHANGE UP
GLUCOSE SERPL-MCNC: 87 MG/DL — SIGNIFICANT CHANGE UP (ref 70–99)
HCT VFR BLD CALC: 30.9 % — LOW (ref 39–50)
HGB BLD-MCNC: 9.9 G/DL — LOW (ref 13–17)
MAGNESIUM SERPL-MCNC: 1.4 MG/DL — LOW (ref 1.6–2.6)
MCHC RBC-ENTMCNC: 32 GM/DL — SIGNIFICANT CHANGE UP (ref 32–36)
MCHC RBC-ENTMCNC: 32.2 PG — SIGNIFICANT CHANGE UP (ref 27–34)
MCV RBC AUTO: 100.7 FL — HIGH (ref 80–100)
OSMOLALITY SERPL: 443 MOSMOL/KG — HIGH (ref 275–300)
PLATELET # BLD AUTO: 222 K/UL — SIGNIFICANT CHANGE UP (ref 150–400)
POTASSIUM SERPL-MCNC: 3.4 MMOL/L — LOW (ref 3.5–5.3)
POTASSIUM SERPL-SCNC: 3.4 MMOL/L — LOW (ref 3.5–5.3)
RBC # BLD: 3.07 M/UL — LOW (ref 4.2–5.8)
RBC # FLD: 12.7 % — SIGNIFICANT CHANGE UP (ref 10.3–14.5)
SODIUM SERPL-SCNC: 149 MMOL/L — HIGH (ref 135–145)
SPECIMEN SOURCE: SIGNIFICANT CHANGE UP
WBC # BLD: 5.68 K/UL — SIGNIFICANT CHANGE UP (ref 3.8–10.5)
WBC # FLD AUTO: 5.68 K/UL — SIGNIFICANT CHANGE UP (ref 3.8–10.5)

## 2020-11-24 RX ORDER — POTASSIUM CHLORIDE 20 MEQ
40 PACKET (EA) ORAL ONCE
Refills: 0 | Status: COMPLETED | OUTPATIENT
Start: 2020-11-24 | End: 2020-11-24

## 2020-11-24 RX ORDER — ENOXAPARIN SODIUM 100 MG/ML
40 INJECTION SUBCUTANEOUS DAILY
Refills: 0 | Status: DISCONTINUED | OUTPATIENT
Start: 2020-11-24 | End: 2020-12-01

## 2020-11-24 RX ORDER — DEXMEDETOMIDINE HYDROCHLORIDE IN 0.9% SODIUM CHLORIDE 4 UG/ML
0.5 INJECTION INTRAVENOUS
Qty: 200 | Refills: 0 | Status: DISCONTINUED | OUTPATIENT
Start: 2020-11-24 | End: 2020-11-28

## 2020-11-24 RX ORDER — SODIUM CHLORIDE 9 MG/ML
500 INJECTION, SOLUTION INTRAVENOUS ONCE
Refills: 0 | Status: COMPLETED | OUTPATIENT
Start: 2020-11-24 | End: 2020-11-24

## 2020-11-24 RX ORDER — MAGNESIUM SULFATE 500 MG/ML
2 VIAL (ML) INJECTION EVERY 4 HOURS
Refills: 0 | Status: COMPLETED | OUTPATIENT
Start: 2020-11-24 | End: 2020-11-24

## 2020-11-24 RX ORDER — POTASSIUM CHLORIDE 20 MEQ
40 PACKET (EA) ORAL ONCE
Refills: 0 | Status: DISCONTINUED | OUTPATIENT
Start: 2020-11-24 | End: 2020-11-24

## 2020-11-24 RX ORDER — DEXMEDETOMIDINE HYDROCHLORIDE IN 0.9% SODIUM CHLORIDE 4 UG/ML
0.5 INJECTION INTRAVENOUS
Qty: 200 | Refills: 0 | Status: DISCONTINUED | OUTPATIENT
Start: 2020-11-24 | End: 2020-11-24

## 2020-11-24 RX ORDER — MIDAZOLAM HYDROCHLORIDE 1 MG/ML
0.02 INJECTION, SOLUTION INTRAMUSCULAR; INTRAVENOUS
Qty: 100 | Refills: 0 | Status: DISCONTINUED | OUTPATIENT
Start: 2020-11-24 | End: 2020-11-25

## 2020-11-24 RX ADMIN — DEXMEDETOMIDINE HYDROCHLORIDE IN 0.9% SODIUM CHLORIDE 11.3 MICROGRAM(S)/KG/HR: 4 INJECTION INTRAVENOUS at 15:20

## 2020-11-24 RX ADMIN — SODIUM CHLORIDE 125 MILLILITER(S): 9 INJECTION, SOLUTION INTRAVENOUS at 05:50

## 2020-11-24 RX ADMIN — Medication 40 MILLIEQUIVALENT(S): at 11:42

## 2020-11-24 RX ADMIN — ENOXAPARIN SODIUM 40 MILLIGRAM(S): 100 INJECTION SUBCUTANEOUS at 13:08

## 2020-11-24 RX ADMIN — Medication 50 GRAM(S): at 17:05

## 2020-11-24 RX ADMIN — SODIUM CHLORIDE 2000 MILLILITER(S): 9 INJECTION, SOLUTION INTRAVENOUS at 00:36

## 2020-11-24 RX ADMIN — Medication 50 GRAM(S): at 13:06

## 2020-11-24 RX ADMIN — SODIUM CHLORIDE 125 MILLILITER(S): 9 INJECTION, SOLUTION INTRAVENOUS at 21:13

## 2020-11-24 RX ADMIN — CHLORHEXIDINE GLUCONATE 1 APPLICATION(S): 213 SOLUTION TOPICAL at 06:10

## 2020-11-24 RX ADMIN — Medication 100 MILLIGRAM(S): at 09:10

## 2020-11-24 RX ADMIN — DEXMEDETOMIDINE HYDROCHLORIDE IN 0.9% SODIUM CHLORIDE 11.3 MICROGRAM(S)/KG/HR: 4 INJECTION INTRAVENOUS at 21:14

## 2020-11-24 RX ADMIN — SODIUM CHLORIDE 125 MILLILITER(S): 9 INJECTION, SOLUTION INTRAVENOUS at 13:49

## 2020-11-24 RX ADMIN — PANTOPRAZOLE SODIUM 40 MILLIGRAM(S): 20 TABLET, DELAYED RELEASE ORAL at 09:10

## 2020-11-24 RX ADMIN — Medication 101 MILLIGRAM(S): at 09:11

## 2020-11-24 RX ADMIN — CHLORHEXIDINE GLUCONATE 15 MILLILITER(S): 213 SOLUTION TOPICAL at 00:41

## 2020-11-24 RX ADMIN — DEXMEDETOMIDINE HYDROCHLORIDE IN 0.9% SODIUM CHLORIDE 11.3 MICROGRAM(S)/KG/HR: 4 INJECTION INTRAVENOUS at 19:20

## 2020-11-24 RX ADMIN — CHLORHEXIDINE GLUCONATE 15 MILLILITER(S): 213 SOLUTION TOPICAL at 21:13

## 2020-11-24 RX ADMIN — DEXMEDETOMIDINE HYDROCHLORIDE IN 0.9% SODIUM CHLORIDE 11.3 MICROGRAM(S)/KG/HR: 4 INJECTION INTRAVENOUS at 13:50

## 2020-11-24 RX ADMIN — MIDAZOLAM HYDROCHLORIDE 1.81 MG/KG/HR: 1 INJECTION, SOLUTION INTRAMUSCULAR; INTRAVENOUS at 00:52

## 2020-11-24 RX ADMIN — DEXMEDETOMIDINE HYDROCHLORIDE IN 0.9% SODIUM CHLORIDE 11.3 MICROGRAM(S)/KG/HR: 4 INJECTION INTRAVENOUS at 10:30

## 2020-11-24 RX ADMIN — CHLORHEXIDINE GLUCONATE 15 MILLILITER(S): 213 SOLUTION TOPICAL at 09:10

## 2020-11-24 NOTE — DIETITIAN INITIAL EVALUATION ADULT. - ENTERAL
1) Initiate Jevity 1.5 @ 30cc/hr increase 20cc/hr Q10 hours until goal rate of 50cc/hr (total volume 1000ml) + 6 packs of prosource TF daily (total provided 1740 calories/ 130gm protein/ 760ml free water from TF formula). 2) Additional continuous water flush @ 25cc/hr (total volume 500ml) Total volume daily including free water from formula= 1260ml

## 2020-11-24 NOTE — PATIENT PROFILE ADULT - SURGICAL SITE INCISION
PHYSICAL THERAPY - DAILY TREATMENT NOTE    Patient Name: Mario Alberto Wharton        Date: 9/10/2020  : 1949   YES Patient  Verified  Visit #:     Insurance: Payor: Nellie Slight / Plan: [place] PART [place] & B / Product Type: Medicare /      In time: 8:02 Out time: 8:46   Total Treatment Time: 44     Medicare/BCBS [place] Time Tracking (below)   Total Timed Codes (min):  44 1:1 Treatment Time:  44     TREATMENT AREA =  Gait instability [R26.81]  Vestibular ataxic gait [R26.0]    SUBJECTIVE    Pain Level (on 0 to 10 scale):  0  / 10   Medication Changes/New allergies or changes in medical history, any new surgeries or procedures?    no    If yes, update Summary List   Subjective Functional Status/Changes:  []  No changes reported     Denies any dizziness. Reports lifting her head while lying supine no longer causes dizziness. OBJECTIVE    30 min Therapeutic Exercise:  [x]  See flow sheet   Rationale:      increase ROM, increase strength, improve coordination, improve balance and increase proprioception to improve the patients ability to perform ADL's, gait, and functional mobility with increased safety and decreased symptoms. 14 min Neuromuscular Re-ed: EO/EC balance exercises, cone taps   Rationale:      increase strength, improve coordination, improve balance and increase proprioception to improve the patients ability to perform ADL's, gait, and functional mobility with increased safety and decreased symptoms. min Patient Education:  YES  Reviewed HEP   []  Progressed/Changed HEP based on:   Cont HEP     Other Objective/Functional Measures:    Increased resistance with minisquats  Added cone taps with 2 cones to promote single leg stance. Post Treatment Pain Level (on 0 to 10) scale:   0  / 10     ASSESSMENT    Assessment/Changes in Function:     Patient challenged with current balance HEP.      []  See Progress Note/Recertification   Patient will continue to benefit from skilled PT services to modify and progress therapeutic interventions, address functional mobility deficits, address ROM deficits, address strength deficits, analyze and address soft tissue restrictions, analyze and cue movement patterns, analyze and modify body mechanics/ergonomics, assess and modify postural abnormalities, address imbalance/dizziness and instruct in home and community integration to attain remaining goals. Progress toward goals / Updated goals:    Progressing toward goals:  1. Patient will demonstrate independence with HEP.   reports continued compliance with HEP  2. Patient will report 50% improvement in dizziness to allow improved activity tolerance and increased safety with functional mobility and gait. continues to report decreased dizziness. Denies dizziness  3. Patient will score 72/100 on FOTO to indicate improved function.      PLAN    [x]  Upgrade activities as tolerated YES Continue plan of care   []  Discharge due to :    []  Other:      Therapist: Estefani Sarabia PT    Date: 9/10/2020 Time: 7:03 AM     Future Appointments   Date Time Provider Yandy Ramos   9/10/2020  8:00 AM Cole Barrios, HIT[facility]   9/15/2020  8:00 AM Elvie Mcdonald PT [facility]   9/17/2020  8:00 AM Elvie Mcdonald PT [facility] no

## 2020-11-24 NOTE — CHART NOTE - TREATMENT: THE FOLLOWING DIET HAS BEEN RECOMMENDED
Diet, NPO:   Except Medications     Special Instructions for Nursing:  Except Medications (11-23-20 @ 20:05) [Active]        .    Malnutrition moderate protein/calorie malnutrition in context of social circumstances.   Etiology suboptimal nutrition to meet nutritional needs 2/2 ETOH.   Signs/Symptoms poor po intake, significant weight gain (+fluid accumulation), mild muscle/fat wasting.   Goal/Expected Outcome Optimal po intake to meet >80% of estimated nutritional needs. Abstinence of alcohol.      · Additional Recommendations  1) initiate enteral feeds as recommended if unable to wean from ventilator >3 days.   2) Monitor strict I & O's   3) monitor labs/lytes and hydration continue to replete as needed.   4) Add as per CIWA protocol MVI w/ minerals and Folic acid, continue with thiamine.   5) Maintain aspiration precautions, back of bed >35 degrees.   6) If extubated advance po diet to low sodium pending SLP evaluation.   7) consider if po diet advanced initiating additional po supplements enriched with calories/protein to meet nutritional needs.      · Enteral Recommendations  1) Initiate Jevity 1.5 @ 30cc/hr increase 20cc/hr Q10 hours until goal rate of 50cc/hr (total volume 1000ml) + 6 packs of prosource TF daily (total provided 1740 calories/ 130gm protein/ 760ml free water from TF formula).   2) Additional continuous water flush @ 25cc/hr (total volume 500ml) Total volume daily including free water from formula= 1260ml

## 2020-11-24 NOTE — PROGRESS NOTE ADULT - ASSESSMENT
A:    40yMale  HD # 2  FULL CODE    Here for:    1. AMS/obtundation 2/2  2. Acute hypercarbic resp failure, and  3. Alcohol intoxication  4. Alcohol withdrawal syndrome  5. Hypokalemia  6. Hypomagnesemia    This patient requires critical care for support of one or more vital organ systems with a high probability of imminent or life threatening deterioration in his/her condition    P:    Start precedex, d/c versed drip. Librium 100mg PO x 1 dose to provide long acting baseline AKASH blockade given multiple admissions for severe alcohol withdrawal syndrome. Once intubated, start BZD taper + breakthrough PRN. Goal RASS 0 to -2. Daily sedation vacations.   HCT without acute pathology.  HD monitoring.   s/p emergent intubation for obtundation, acute hypercarbic resp failure in setting of profound alcohol intoxication. VAC 20/400/5/.40, ABG 7.31/51/180 11/23 PM. Wean to extubate.   NPO, NGT OK to use for meds. Start TF if still intubated tomorrow.  Not on abx, no s/s infection. WBC 5k, afebrile.  VTE ppx lovenox, SCDs. GI ppx protonix 40mg IV qd.  Thiamine 100mg IV qd, switch to PO tomorrow.   PO folate, MVI while able to take.  IVF with D5LR @ 125cc/hr. Maintain until taking PO.  SW consult when extubated for SBIRT.  Replete Mg++, K+.  Maintain ETT, OGT, cornelius for now.     Dispo: Cont critical care.    TOTAL CRITICAL CARE TIME:  38 minutes (EXCLUSIVE of any non bundled procedures)    Note: This time spent INCLUDES time spent directly as this patient's bedside with evaluation, review of chart including review of laboratory and imaging studies, interpretation of vital signs and cardiac output measurements, any necessary ventilator management, and time spent discussing plan of care with patient and family, including goals of care discussion.

## 2020-11-24 NOTE — DIETITIAN INITIAL EVALUATION ADULT. - OTHER INFO
40M with PMHx of EtOH abuse with admission in past for EtOH intoxication/withdrawal who presented to ED [facility] after being found on ground intoxicated [place] [employer]. Found to be hypothermic to 80s, BAL>500. Pt with progressive AMS. VBG revealed elevated pCO2. Pt unable to effectively protect airway and was intubated in ED. ICU consulted for evaluation and further management. Remains intubated on versed drip. Hypomagnesia and Hypokalemia - supplemented. Hypernatremia 149^(11/24) fluids infusing. WA protocol- Thiamine ordered. Suggest as per protocol MVI w/ minerals and Folic acid. I&O's Detail 23 Nov 2020 07:01- 24 Nov 2020 07:00  IN: dextrose 5% + lactated ringers: 875 mL Lactated Ringers Bolus: 500 ml Total IN: 1375 mL OUT: Indwelling Catheter - Urethral (mL): 650 mL Total OUT: 650 mL Total NET: 725 mL +fluid volume noted monitor hydration and fluid accumulation. Last BM- 11/24.  Unable to perform complete NFPE due to supportive equipment however pt with mild ocular and temporal fat/muscle wasting. Will continue to monitor and if pt remains intubated suggest initiating enteral feeds for nutrition support.

## 2020-11-24 NOTE — DIETITIAN INITIAL EVALUATION ADULT. - ADD RECOMMEND
1) initiate enteral feeds as recommended if unable to wean from ventilator >3 days. 2) Monitor strict I & O's 3) monitor labs/lytes and hydration continue to replete as needed. 4) Add as per CIWA protocol MVI w/ minerals and Folic acid, continue with thiamine. 5) Maintain aspiration precautions, back of bed >35 degrees. 6) If extubated advance po diet to low sodium pending SLP evaluation. 7) consider if po diet advanced initiating additional po supplements enriched with calories/protein to meet nutritional needs.

## 2020-11-24 NOTE — DIETITIAN INITIAL EVALUATION ADULT. - PERTINENT MEDS FT
MEDICATIONS  (STANDING):  chlorhexidine 0.12% Liquid 15 milliLiter(s) Oral Mucosa every 12 hours  chlorhexidine 2% Cloths 1 Application(s) Topical <User Schedule>  dexMEDEtomidine Infusion 0.5 MICROgram(s)/kG/Hr (11.3 mL/Hr) IV Continuous <Continuous>  dextrose 5% + lactated ringers. 1000 milliLiter(s) (125 mL/Hr) IV Continuous <Continuous>  enoxaparin Injectable 40 milliGRAM(s) SubCutaneous daily  magnesium sulfate  IVPB 2 Gram(s) IV Intermittent every 4 hours  midazolam Infusion 0.02 mG/kG/Hr (1.81 mL/Hr) IV Continuous <Continuous>  pantoprazole  Injectable 40 milliGRAM(s) IV Push daily  thiamine IVPB 100 milliGRAM(s) IV Intermittent daily    MEDICATIONS  (PRN):

## 2020-11-24 NOTE — DIETITIAN INITIAL EVALUATION ADULT. - PERTINENT LABORATORY DATA
11-24    149<H>  |  114<H>  |  5<L>  ----------------------------<  87  3.4<L>   |  26  |  0.47<L>    Ca    7.5<L>      24 Nov 2020 06:46  Phos  3.5     11-23  Mg     1.4     11-24    TPro  7.9  /  Alb  3.3  /  TBili  0.2  /  DBili  x   /  AST  40<H>  /  ALT  25  /  AlkPhos  144<H>  11-23

## 2020-11-24 NOTE — PROGRESS NOTE ADULT - SUBJECTIVE AND OBJECTIVE BOX
ICU Progress Note    HPI:    S:    Pt seen and examined  HD # 2  FULL CODE  PMHx alcoholism  Pt here for found down outside , intoxicated  Multiple admission here for same with similar presentations  Intubated in ER for obtundation, hypercarbia     AM: Remains intubated. On versed drip.     ROS: Unable to obtain, intubated    Allergies    No Known Allergies    Intolerances        MEDICATIONS  (STANDING):  chlorhexidine 0.12% Liquid 15 milliLiter(s) Oral Mucosa every 12 hours  chlorhexidine 2% Cloths 1 Application(s) Topical <User Schedule>  dexMEDEtomidine Infusion 0.5 MICROgram(s)/kG/Hr (11.3 mL/Hr) IV Continuous <Continuous>  dextrose 5% + lactated ringers. 1000 milliLiter(s) (125 mL/Hr) IV Continuous <Continuous>  midazolam Infusion 0.02 mG/kG/Hr (1.81 mL/Hr) IV Continuous <Continuous>  pantoprazole  Injectable 40 milliGRAM(s) IV Push daily  propofol Infusion 20 MICROgram(s)/kG/Min (10.9 mL/Hr) IV Continuous <Continuous>  thiamine IVPB 100 milliGRAM(s) IV Intermittent daily    MEDICATIONS  (PRN):    Drug Dosing Weight  Height (cm): 170.2 (2020 17:48)  Weight (kg): 90.7 (2020 17:48)  BMI (kg/m2): 31.3 (2020 17:48)  BSA (m2): 2.02 (2020 17:48)    PAST MEDICAL & SURGICAL HISTORY:  No significant past surgical history    FAMILY HISTORY:      ROS: See HPI; otherwise, all systems reviewed and negative.    O:    ICU Vital Signs Last 24 Hrs  T(C): 37.3 (2020 08:00), Max: 37.5 (2020 05:30)  T(F): 99.1 (2020 08:00), Max: 99.5 (2020 05:30)  HR: 108 (2020 08:00) (67 - 108)  BP: 111/61 (2020 08:00) (85/47 - 182/142)  BP(mean): 72 (2020 08:00) (56 - 75)  ABP: --  ABP(mean): --  RR: 22 (2020 08:00) (11 - 25)  SpO2: 100% (2020 08:00) (96% - 100%)      ABG - ( 2020 22:11 )  pH, Arterial: 7.31  pH, Blood: x     /  pCO2: 51    /  pO2: 180   / HCO3: 25    / Base Excess: -1.3  /  SaO2: 98                  I&O's Detail    2020 07:01  -  2020 07:00  --------------------------------------------------------  IN:    dextrose 5% + lactated ringers: 875 mL    Lactated Ringers Bolus: 500 mL  Total IN: 1375 mL    OUT:    Indwelling Catheter - Urethral (mL): 650 mL  Total OUT: 650 mL    Total NET: 725 mL          Mode: AC/ CMV (Assist Control/ Continuous Mandatory Ventilation)  RR (machine): 20  TV (machine): 400  FiO2: 40  PEEP: 5  ITime: 1  MAP: 8  PIP: 24      PE:    Adult M lying in bed  + abrasion to bridge of nose, no other stigmata of trauma noted  No JVD trachea midline  S1S2+ RRR no M/R/G  CTA B/L good BS B/L  Abd soft NTND  No LE swelling/edema noted  Intubated, sedated      LABS:    CBC Full  -  ( 2020 06:46 )  WBC Count : 5.68 K/uL  RBC Count : 3.07 M/uL  Hemoglobin : 9.9 g/dL  Hematocrit : 30.9 %  Platelet Count - Automated : 222 K/uL  Mean Cell Volume : 100.7 fl  Mean Cell Hemoglobin : 32.2 pg  Mean Cell Hemoglobin Concentration : 32.0 gm/dL  Auto Neutrophil # : x  Auto Lymphocyte # : x  Auto Monocyte # : x  Auto Eosinophil # : x  Auto Basophil # : x  Auto Neutrophil % : x  Auto Lymphocyte % : x  Auto Monocyte % : x  Auto Eosinophil % : x  Auto Basophil % : x    11-24    149<H>  |  114<H>  |  5<L>  ----------------------------<  87  3.4<L>   |  26  |  0.47<L>    Ca    7.5<L>      2020 06:46  Phos  3.5     -  Mg     1.4     -    TPro  7.9  /  Alb  3.3  /  TBili  0.2  /  DBili  x   /  AST  40<H>  /  ALT  25  /  AlkPhos  144<H>        Urinalysis Basic - ( 2020 18:56 )    Color: Yellow / Appearance: Clear / S.015 / pH: x  Gluc: x / Ketone: Negative  / Bili: Negative / Urobili: Negative mg/dL   Blood: x / Protein: 30 mg/dL / Nitrite: Negative   Leuk Esterase: Negative / RBC: 0-2 /HPF / WBC Negative   Sq Epi: x / Non Sq Epi: Negative / Bacteria: Negative      CAPILLARY BLOOD GLUCOSE      POCT Blood Glucose.: 142 mg/dL (2020 18:10)    CARDIAC MARKERS ( 2020 18:26 )  <0.015 ng/mL / x     / 206 U/L / x     / x          LIVER FUNCTIONS - ( 2020 18:26 )  Alb: 3.3 g/dL / Pro: 7.9 gm/dL / ALK PHOS: 144 U/L / ALT: 25 U/L / AST: 40 U/L / GGT: x

## 2020-11-25 LAB
ADD ON TEST-SPECIMEN IN LAB: SIGNIFICANT CHANGE UP
ALBUMIN SERPL ELPH-MCNC: 2.2 G/DL — LOW (ref 3.3–5)
ALP SERPL-CCNC: 106 U/L — SIGNIFICANT CHANGE UP (ref 40–120)
ALT FLD-CCNC: 15 U/L — SIGNIFICANT CHANGE UP (ref 12–78)
ANION GAP SERPL CALC-SCNC: 4 MMOL/L — LOW (ref 5–17)
AST SERPL-CCNC: 30 U/L — SIGNIFICANT CHANGE UP (ref 15–37)
BILIRUB SERPL-MCNC: 0.5 MG/DL — SIGNIFICANT CHANGE UP (ref 0.2–1.2)
BUN SERPL-MCNC: 4 MG/DL — LOW (ref 7–23)
CALCIUM SERPL-MCNC: 8 MG/DL — LOW (ref 8.5–10.1)
CHLORIDE SERPL-SCNC: 107 MMOL/L — SIGNIFICANT CHANGE UP (ref 96–108)
CO2 SERPL-SCNC: 31 MMOL/L — SIGNIFICANT CHANGE UP (ref 22–31)
CREAT SERPL-MCNC: 0.48 MG/DL — LOW (ref 0.5–1.3)
GLUCOSE SERPL-MCNC: 158 MG/DL — HIGH (ref 70–99)
HCT VFR BLD CALC: 36.1 % — LOW (ref 39–50)
HGB BLD-MCNC: 11.7 G/DL — LOW (ref 13–17)
MCHC RBC-ENTMCNC: 32.3 PG — SIGNIFICANT CHANGE UP (ref 27–34)
MCHC RBC-ENTMCNC: 32.4 GM/DL — SIGNIFICANT CHANGE UP (ref 32–36)
MCV RBC AUTO: 99.7 FL — SIGNIFICANT CHANGE UP (ref 80–100)
PLATELET # BLD AUTO: 156 K/UL — SIGNIFICANT CHANGE UP (ref 150–400)
POTASSIUM SERPL-MCNC: 3.5 MMOL/L — SIGNIFICANT CHANGE UP (ref 3.5–5.3)
POTASSIUM SERPL-SCNC: 3.5 MMOL/L — SIGNIFICANT CHANGE UP (ref 3.5–5.3)
PROT SERPL-MCNC: 5.9 GM/DL — LOW (ref 6–8.3)
RBC # BLD: 3.62 M/UL — LOW (ref 4.2–5.8)
RBC # FLD: 12.2 % — SIGNIFICANT CHANGE UP (ref 10.3–14.5)
SARS-COV-2 IGG SERPL QL IA: NEGATIVE — SIGNIFICANT CHANGE UP
SARS-COV-2 IGM SERPL IA-ACNC: 0.8 INDEX — SIGNIFICANT CHANGE UP
SODIUM SERPL-SCNC: 142 MMOL/L — SIGNIFICANT CHANGE UP (ref 135–145)
WBC # BLD: 6.68 K/UL — SIGNIFICANT CHANGE UP (ref 3.8–10.5)
WBC # FLD AUTO: 6.68 K/UL — SIGNIFICANT CHANGE UP (ref 3.8–10.5)

## 2020-11-25 RX ORDER — MAGNESIUM SULFATE 500 MG/ML
2 VIAL (ML) INJECTION
Refills: 0 | Status: COMPLETED | OUTPATIENT
Start: 2020-11-25 | End: 2020-11-25

## 2020-11-25 RX ORDER — POTASSIUM CHLORIDE 20 MEQ
40 PACKET (EA) ORAL EVERY 4 HOURS
Refills: 0 | Status: COMPLETED | OUTPATIENT
Start: 2020-11-25 | End: 2020-11-25

## 2020-11-25 RX ORDER — PROPOFOL 10 MG/ML
40 INJECTION, EMULSION INTRAVENOUS
Qty: 1000 | Refills: 0 | Status: DISCONTINUED | OUTPATIENT
Start: 2020-11-25 | End: 2020-11-28

## 2020-11-25 RX ORDER — MAGNESIUM SULFATE 500 MG/ML
2 VIAL (ML) INJECTION
Refills: 0 | Status: DISCONTINUED | OUTPATIENT
Start: 2020-11-25 | End: 2020-11-25

## 2020-11-25 RX ORDER — FOLIC ACID 0.8 MG
1 TABLET ORAL DAILY
Refills: 0 | Status: DISCONTINUED | OUTPATIENT
Start: 2020-11-25 | End: 2020-11-28

## 2020-11-25 RX ORDER — ACETAMINOPHEN 500 MG
650 TABLET ORAL EVERY 6 HOURS
Refills: 0 | Status: DISCONTINUED | OUTPATIENT
Start: 2020-11-25 | End: 2020-12-01

## 2020-11-25 RX ORDER — POTASSIUM CHLORIDE 20 MEQ
40 PACKET (EA) ORAL EVERY 4 HOURS
Refills: 0 | Status: DISCONTINUED | OUTPATIENT
Start: 2020-11-25 | End: 2020-11-26

## 2020-11-25 RX ADMIN — SODIUM CHLORIDE 125 MILLILITER(S): 9 INJECTION, SOLUTION INTRAVENOUS at 06:25

## 2020-11-25 RX ADMIN — DEXMEDETOMIDINE HYDROCHLORIDE IN 0.9% SODIUM CHLORIDE 11.3 MICROGRAM(S)/KG/HR: 4 INJECTION INTRAVENOUS at 05:45

## 2020-11-25 RX ADMIN — PANTOPRAZOLE SODIUM 40 MILLIGRAM(S): 20 TABLET, DELAYED RELEASE ORAL at 09:10

## 2020-11-25 RX ADMIN — Medication 50 MILLIGRAM(S): at 08:54

## 2020-11-25 RX ADMIN — CHLORHEXIDINE GLUCONATE 1 APPLICATION(S): 213 SOLUTION TOPICAL at 05:45

## 2020-11-25 RX ADMIN — Medication 2 MILLIGRAM(S): at 17:11

## 2020-11-25 RX ADMIN — PROPOFOL 21.8 MICROGRAM(S)/KG/MIN: 10 INJECTION, EMULSION INTRAVENOUS at 17:47

## 2020-11-25 RX ADMIN — ENOXAPARIN SODIUM 40 MILLIGRAM(S): 100 INJECTION SUBCUTANEOUS at 11:12

## 2020-11-25 RX ADMIN — Medication 2 MILLIGRAM(S): at 07:39

## 2020-11-25 RX ADMIN — Medication 50 GRAM(S): at 11:09

## 2020-11-25 RX ADMIN — Medication 2 MILLIGRAM(S): at 20:12

## 2020-11-25 RX ADMIN — CHLORHEXIDINE GLUCONATE 15 MILLILITER(S): 213 SOLUTION TOPICAL at 22:12

## 2020-11-25 RX ADMIN — Medication 40 MILLIEQUIVALENT(S): at 11:03

## 2020-11-25 RX ADMIN — Medication 50 GRAM(S): at 10:50

## 2020-11-25 RX ADMIN — Medication 1 TABLET(S): at 12:42

## 2020-11-25 RX ADMIN — Medication 40 MILLIEQUIVALENT(S): at 22:12

## 2020-11-25 RX ADMIN — DEXMEDETOMIDINE HYDROCHLORIDE IN 0.9% SODIUM CHLORIDE 11.3 MICROGRAM(S)/KG/HR: 4 INJECTION INTRAVENOUS at 09:11

## 2020-11-25 RX ADMIN — Medication 1 MILLIGRAM(S): at 12:42

## 2020-11-25 RX ADMIN — Medication 40 MILLIEQUIVALENT(S): at 17:12

## 2020-11-25 RX ADMIN — PROPOFOL 21.8 MICROGRAM(S)/KG/MIN: 10 INJECTION, EMULSION INTRAVENOUS at 11:01

## 2020-11-25 RX ADMIN — Medication 2 MILLIGRAM(S): at 22:10

## 2020-11-25 RX ADMIN — Medication 2 MILLIGRAM(S): at 03:50

## 2020-11-25 RX ADMIN — DEXMEDETOMIDINE HYDROCHLORIDE IN 0.9% SODIUM CHLORIDE 11.3 MICROGRAM(S)/KG/HR: 4 INJECTION INTRAVENOUS at 07:39

## 2020-11-25 RX ADMIN — Medication 101 MILLIGRAM(S): at 09:10

## 2020-11-25 RX ADMIN — Medication 2 MILLIGRAM(S): at 11:01

## 2020-11-25 RX ADMIN — Medication 40 MILLIEQUIVALENT(S): at 14:00

## 2020-11-25 RX ADMIN — CHLORHEXIDINE GLUCONATE 15 MILLILITER(S): 213 SOLUTION TOPICAL at 09:59

## 2020-11-25 RX ADMIN — Medication 2 MILLIGRAM(S): at 12:41

## 2020-11-25 NOTE — PROGRESS NOTE ADULT - SUBJECTIVE AND OBJECTIVE BOX
ICU Progress Note    HPI:    S:    Pt seen and examined  HD # 3  FULL CODE  PMHx alcoholism  Pt here for found down outside , intoxicated  Multiple admission here for same with similar presentations  Intubated in ER for obtundation, hypercarbia     AM: Remains intubated. On versed drip.    AM: Remains intubated. Agitation overnight requiring additional ativan dosing and increase in precedex drip.     ROS: Unable to obtain, intubated      Allergies    No Known Allergies    Intolerances        MEDICATIONS  (STANDING):  chlorhexidine 0.12% Liquid 15 milliLiter(s) Oral Mucosa every 12 hours  chlorhexidine 2% Cloths 1 Application(s) Topical <User Schedule>  dexMEDEtomidine Infusion 0.5 MICROgram(s)/kG/Hr (11.3 mL/Hr) IV Continuous <Continuous>  dextrose 5% + lactated ringers. 1000 milliLiter(s) (125 mL/Hr) IV Continuous <Continuous>  enoxaparin Injectable 40 milliGRAM(s) SubCutaneous daily  LORazepam   Injectable 2 milliGRAM(s) IV Push every 6 hours  magnesium sulfate  IVPB 2 Gram(s) IV Intermittent every 1 hour  pantoprazole  Injectable 40 milliGRAM(s) IV Push daily  potassium chloride   Powder 40 milliEquivalent(s) Oral every 4 hours  thiamine IVPB 100 milliGRAM(s) IV Intermittent daily    MEDICATIONS  (PRN):      Drug Dosing Weight  Height (cm): 170.2 (2020 17:48)  Weight (kg): 90.7 (2020 17:48)  BMI (kg/m2): 31.3 (2020 17:48)  BSA (m2): 2.02 (2020 17:48)    PAST MEDICAL & SURGICAL HISTORY:  No significant past surgical history        FAMILY HISTORY:          ROS: See HPI; otherwise, all systems reviewed and negative.    O:    ICU Vital Signs Last 24 Hrs  T(C): 37 (2020 09:11), Max: 37.1 (2020 19:00)  T(F): 98.6 (2020 09:11), Max: 98.8 (2020 19:00)  HR: 46 (2020 08:00) (45 - 87)  BP: 154/90 (2020 08:00) (80/47 - 154/90)  BP(mean): 104 (2020 08:00) (53 - 104)  ABP: --  ABP(mean): --  RR: 12 (2020 11:00) (12 - 20)  SpO2: 95% (2020 06:00) (95% - 100%)      ABG - ( 2020 22:11 )  pH, Arterial: 7.31  pH, Blood: x     /  pCO2: 51    /  pO2: 180   / HCO3: 25    / Base Excess: -1.3  /  SaO2: 98                  I&O's Detail    2020 07:01  -  2020 07:00  --------------------------------------------------------  IN:    Dexmedetomidine: 289 mL    dextrose 5% + lactated ringers: 1384 mL  Total IN: 1673 mL    OUT:    Indwelling Catheter - Urethral (mL): 1325 mL  Total OUT: 1325 mL    Total NET: 348 mL          Mode: AC/ CMV (Assist Control/ Continuous Mandatory Ventilation)  RR (machine): 20  TV (machine): 400  FiO2: 40  PEEP: 5  ITime: 1  PIP: 19      PE:    Adult M lying in bed  + abrasion to bridge of nose, no other stigmata of trauma noted  No JVD trachea midline  S1S2+ RRR no M/R/G  CTA B/L good BS B/L  Abd soft NTND  No LE swelling/edema noted  Intubated, sedated      LABS:    CBC Full  -  ( 2020 06:46 )  WBC Count : 5.68 K/uL  RBC Count : 3.07 M/uL  Hemoglobin : 9.9 g/dL  Hematocrit : 30.9 %  Platelet Count - Automated : 222 K/uL  Mean Cell Volume : 100.7 fl  Mean Cell Hemoglobin : 32.2 pg  Mean Cell Hemoglobin Concentration : 32.0 gm/dL  Auto Neutrophil # : x  Auto Lymphocyte # : x  Auto Monocyte # : x  Auto Eosinophil # : x  Auto Basophil # : x  Auto Neutrophil % : x  Auto Lymphocyte % : x  Auto Monocyte % : x  Auto Eosinophil % : x  Auto Basophil % : x    11-24    149<H>  |  114<H>  |  5<L>  ----------------------------<  87  3.4<L>   |  26  |  0.47<L>    Ca    7.5<L>      2020 06:46  Phos  3.5       Mg     1.4         TPro  7.9  /  Alb  3.3  /  TBili  0.2  /  DBili  x   /  AST  40<H>  /  ALT  25  /  AlkPhos  144<H>        Urinalysis Basic - ( 2020 18:56 )    Color: Yellow / Appearance: Clear / S.015 / pH: x  Gluc: x / Ketone: Negative  / Bili: Negative / Urobili: Negative mg/dL   Blood: x / Protein: 30 mg/dL / Nitrite: Negative   Leuk Esterase: Negative / RBC: 0-2 /HPF / WBC Negative   Sq Epi: x / Non Sq Epi: Negative / Bacteria: Negative      CAPILLARY BLOOD GLUCOSE        CARDIAC MARKERS ( 2020 18:26 )  <0.015 ng/mL / x     / 206 U/L / x     / x          LIVER FUNCTIONS - ( 2020 18:26 )  Alb: 3.3 g/dL / Pro: 7.9 gm/dL / ALK PHOS: 144 U/L / ALT: 25 U/L / AST: 40 U/L / GGT: x

## 2020-11-25 NOTE — PROGRESS NOTE ADULT - ASSESSMENT
A:    40yMale  HD # 3  FULL CODE    Here for:    1. AMS/obtundation 2/2  2. Acute hypercarbic resp failure, and  3. Alcohol intoxication  4. Alcohol withdrawal syndrome  5. Hypokalemia  6. Hypomagnesemia    This patient requires critical care for support of one or more vital organ systems with a high probability of imminent or life threatening deterioration in his/her condition    P:    Continue precedex, currently at 1.5 mcg/kg/min; give librium 50mg PO x 1 dose now, s/p 100mg yesterday to provide long acting baseline AKASH blockade given multiple admissions for severe alcohol withdrawal syndrome. Start ativan 2mg IV q6h ATC. If Pt unable to be weaned to extubate today may require propofol + ATC BZD to manage alcohol withdrawal syndrome. Admissions in past for same requiring high doses of medical mgmt.   HCT without acute pathology.  HD monitoring.   s/p emergent intubation for obtundation, acute hypercarbic resp failure in setting of profound alcohol intoxication. VAC 20/400/5/.40, ABG 7.31/51/180 11/23 PM. Wean to extubate.   NPO, NGT OK to use for meds. Will start TF today if unable to extubate.   Not on abx, no s/s infection. WBC 5k, afebrile.  VTE ppx lovenox, SCDs. GI ppx protonix 40mg IV qd.  Thiamine 100mg IV qd, switch to PO when taking diet.  PO folate, MVI while able to take.  IVF with D5LR @ 125cc/hr. Maintain until taking PO.  SW consult when extubated for SBIRT.  Replete Mg++, K+.  Maintain ETT, OGT, cornelius for now.     Dispo: Cont critical care.    TOTAL CRITICAL CARE TIME:  37 minutes (EXCLUSIVE of any non bundled procedures)    Note: This time spent INCLUDES time spent directly as this patient's bedside with evaluation, review of chart including review of laboratory and imaging studies, interpretation of vital signs and cardiac output measurements, any necessary ventilator management, and time spent discussing plan of care with patient and family, including goals of care discussion.     A:    40yMale  HD # 3  FULL CODE    Here for:    1. AMS/obtundation 2/2  2. Acute hypercarbic resp failure, and  3. Alcohol intoxication  4. Alcohol withdrawal syndrome  5. Hypokalemia  6. Hypomagnesemia    This patient requires critical care for support of one or more vital organ systems with a high probability of imminent or life threatening deterioration in his/her condition    P:    Continue precedex, currently at 1.5 mcg/kg/min; give librium 50mg PO x 1 dose now, s/p 100mg yesterday to provide long acting baseline AKASH blockade given multiple admissions for severe alcohol withdrawal syndrome. Start ativan 2mg IV q6h ATC. If Pt unable to be weaned to extubate today may require propofol + ATC BZD to manage alcohol withdrawal syndrome. Admissions in past for same requiring high doses of medical mgmt.   HCT without acute pathology.  HD monitoring.   s/p emergent intubation for obtundation, acute hypercarbic resp failure in setting of profound alcohol intoxication. VAC 20/400/5/.40, ABG 7.31/51/180 11/23 PM. Wean to extubate.   NPO, NGT OK to use for meds. Will start TF today if unable to extubate.   Not on abx, no s/s infection. WBC 5k, afebrile.  VTE ppx lovenox, SCDs. GI ppx protonix 40mg IV qd.  Thiamine 100mg IV qd, switch to PO when taking diet.  PO folate, MVI while able to take.  IVF with D5LR @ 125cc/hr. Maintain until taking PO.  SW consult when extubated for SBIRT.  Replete Mg++, K+.  Maintain ETT, OGT, cornelius for now.     Dispo: Cont critical care.    TOTAL CRITICAL CARE TIME:  37 minutes (EXCLUSIVE of any non bundled procedures)    Note: This time spent INCLUDES time spent directly as this patient's bedside with evaluation, review of chart including review of laboratory and imaging studies, interpretation of vital signs and cardiac output measurements, any necessary ventilator management, and time spent discussing plan of care with patient and family, including goals of care discussion.    ADDENDUM #1: Pt cannot tolerate SBT's 2/2 extreme agitation. Pt in throes of acute alcohol withdrawal syndrome. Unable to safely extubate today. ATC ativan, start propfol and d/c precedex. Start TF's.

## 2020-11-25 NOTE — CHART NOTE - NSCHARTNOTEFT_GEN_A_CORE
HPI:  40M with PMHx of EtOH abuse with admission in past for EtOH intoxication/withdrawal who presented to ED [facility] after being found on ground intoxicated [place] [employer]. Found to be hypothermic to 80s, BAL>500. Pt with progressive AMS. VBG revealed elevated pCO2. Pt unable to effectively protect airway and was intubated in ED. ICU consulted for evaluation and further management. Remains intubated on versed drip. Hypomagnesia and Hypokalemia - supplemented. Hypernatremia 149^(11/24) fluids infusing. [facility] protocol- Thiamine ordered. Suggest as per protocol MVI w/ minerals and Folic acid. I&O's Detail    24 Nov 2020 07:01  -  25 Nov 2020 07:00  --------------------------------------------------------  IN:    Dexmedetomidine: 289 mL    dextrose 5% + lactated ringers: 1384 mL  Total IN: 1673 mL    OUT:    Indwelling Catheter - Urethral (mL): 1325 mL  Total OUT: 1325 mL    Total NET: 348 mL    +fluid volume noted monitor hydration and fluid accumulation. Last BM- 11/24 x 3.        Recommendations:   1) Jevity 1.5 - 20cc/hr titrate up 20cc/hr Q8 hr (total volume 800ml)   2) 6 Packs of Prosource TF daily- (total provided including propofol calories= 1840 calories/117gm protein/ 600ml free water from enteral formula)  3) Additional continuous water flush @ 30cc/hr (total volume 600ml) Total volume including free water from TF- 1200ml   4) Maintain aspiration precautions, back of bed >35 degrees.   5) MVI w/ minerals, Folic acid, and continue with Thiamine as per [facility] protocol.   6) monitor daily weights  7) monitor lytes and hydration replete as needed  8) monitor strict I & O's (last BM 11/24 x 2 day ago) if no BM >3 days consider initiating bowel meds. HPI:  40M with PMHx of EtOH abuse with admission in past for EtOH intoxication/withdrawal who presented to ED [facility] after being found on ground intoxicated [place] [employer]. Found to be hypothermic to 80s, BAL>500. Pt with progressive AMS. VBG revealed elevated pCO2. Pt unable to effectively protect airway and was intubated in ED. ICU consulted for evaluation and further management. Remains intubated on versed drip. Hypomagnesia and Hypokalemia - supplemented. Hypernatremia 149^(11/24) fluids infusing. [facility] protocol- Thiamine ordered. Suggest as per protocol MVI w/ minerals and Folic acid. I&O's Detail    24 Nov 2020 07:01  -  25 Nov 2020 07:00  --------------------------------------------------------  IN:    Dexmedetomidine: 289 mL    dextrose 5% + lactated ringers: 1384 mL  Total IN: 1673 mL    OUT:    Indwelling Catheter - Urethral (mL): 1325 mL  Total OUT: 1325 mL    Total NET: 348 mL    +fluid volume noted monitor hydration and fluid accumulation. Last BM- 11/24 x 3.        Recommendations:   1) Jevity 1.5 - 20cc/hr titrate up 20cc/hr Q8 hr until goal 40cc/hr (total volume 800ml)   2) 6 Packs of Prosource TF daily- (total provided including propofol calories= 1840 calories/117gm protein/ 600ml free water from enteral formula)  3) Additional continuous water flush @ 30cc/hr (total volume 600ml) Total volume including free water from TF- 1200ml   4) Maintain aspiration precautions, back of bed >35 degrees.   5) MVI w/ minerals, Folic acid, and continue with Thiamine as per [facility] protocol.   6) monitor daily weights  7) monitor lytes and hydration replete as needed  8) monitor strict I & O's (last BM 11/24 x 2 day ago) if no BM >3 days consider initiating bowel meds. HPI:  40M with PMHx of EtOH abuse with admission in past for EtOH intoxication/withdrawal who presented to ED [facility] after being found on ground intoxicated [place] [employer]. Found to be hypothermic to 80s, BAL>500. Pt with progressive AMS. VBG revealed elevated pCO2. Pt unable to effectively protect airway and was intubated in ED. ICU consulted for evaluation and further management. Remains intubated on versed drip. Hypomagnesia and Hypokalemia - supplemented. Hypernatremia 149^(11/24) fluids infusing. [facility] protocol- Thiamine ordered. Suggest as per protocol MVI w/ minerals and Folic acid. Sedation- Propofol infusing (average calories provided ~400 calories (included in enteral feeds).      I&O's Detail    24 Nov 2020 07:01  -  25 Nov 2020 07:00  --------------------------------------------------------  IN:    Dexmedetomidine: 289 mL    dextrose 5% + lactated ringers: 1384 mL  Total IN: 1673 mL    OUT:    Indwelling Catheter - Urethral (mL): 1325 mL  Total OUT: 1325 mL    Total NET: 348 mL    +fluid volume noted monitor hydration and fluid accumulation. Last BM- 11/24 x 3.        Recommendations:   1) Jevity 1.5 - 20cc/hr titrate up 20cc/hr Q8 hr until goal 40cc/hr (total volume 800ml)   2) 6 Packs of Prosource TF daily- (total provided including propofol calories= 1840 calories/117gm protein/ 600ml free water from enteral formula)  3) Additional continuous water flush @ 30cc/hr (total volume 600ml) Total volume including free water from TF- 1200ml   4) Maintain aspiration precautions, back of bed >35 degrees.   5) MVI w/ minerals, Folic acid, and continue with Thiamine as per [facility] protocol.   6) monitor daily weights  7) monitor lytes and hydration replete as needed  8) monitor strict I & O's (last BM 11/24 x 2 day ago) if no BM >3 days consider initiating bowel meds.

## 2020-11-26 LAB
ANION GAP SERPL CALC-SCNC: 4 MMOL/L — LOW (ref 5–17)
ANION GAP SERPL CALC-SCNC: 7 MMOL/L — SIGNIFICANT CHANGE UP (ref 5–17)
BUN SERPL-MCNC: 4 MG/DL — LOW (ref 7–23)
BUN SERPL-MCNC: 5 MG/DL — LOW (ref 7–23)
CALCIUM SERPL-MCNC: 8.3 MG/DL — LOW (ref 8.5–10.1)
CALCIUM SERPL-MCNC: 8.6 MG/DL — SIGNIFICANT CHANGE UP (ref 8.5–10.1)
CHLORIDE SERPL-SCNC: 103 MMOL/L — SIGNIFICANT CHANGE UP (ref 96–108)
CHLORIDE SERPL-SCNC: 105 MMOL/L — SIGNIFICANT CHANGE UP (ref 96–108)
CO2 SERPL-SCNC: 29 MMOL/L — SIGNIFICANT CHANGE UP (ref 22–31)
CO2 SERPL-SCNC: 30 MMOL/L — SIGNIFICANT CHANGE UP (ref 22–31)
CREAT SERPL-MCNC: 0.46 MG/DL — LOW (ref 0.5–1.3)
CREAT SERPL-MCNC: 0.56 MG/DL — SIGNIFICANT CHANGE UP (ref 0.5–1.3)
GLUCOSE SERPL-MCNC: 80 MG/DL — SIGNIFICANT CHANGE UP (ref 70–99)
GLUCOSE SERPL-MCNC: 96 MG/DL — SIGNIFICANT CHANGE UP (ref 70–99)
GRAM STN FLD: SIGNIFICANT CHANGE UP
MAGNESIUM SERPL-MCNC: 1.7 MG/DL — SIGNIFICANT CHANGE UP (ref 1.6–2.6)
PHOSPHATE SERPL-MCNC: 3 MG/DL — SIGNIFICANT CHANGE UP (ref 2.5–4.5)
POTASSIUM SERPL-MCNC: 3.6 MMOL/L — SIGNIFICANT CHANGE UP (ref 3.5–5.3)
POTASSIUM SERPL-MCNC: 4.5 MMOL/L — SIGNIFICANT CHANGE UP (ref 3.5–5.3)
POTASSIUM SERPL-SCNC: 3.6 MMOL/L — SIGNIFICANT CHANGE UP (ref 3.5–5.3)
POTASSIUM SERPL-SCNC: 4.5 MMOL/L — SIGNIFICANT CHANGE UP (ref 3.5–5.3)
PROCALCITONIN SERPL-MCNC: 0.21 NG/ML — HIGH (ref 0.02–0.1)
RAPID RVP RESULT: DETECTED
RV+EV RNA SPEC QL NAA+PROBE: DETECTED
SARS-COV-2 RNA SPEC QL NAA+PROBE: SIGNIFICANT CHANGE UP
SODIUM SERPL-SCNC: 139 MMOL/L — SIGNIFICANT CHANGE UP (ref 135–145)
SODIUM SERPL-SCNC: 139 MMOL/L — SIGNIFICANT CHANGE UP (ref 135–145)
SPECIMEN SOURCE: SIGNIFICANT CHANGE UP
TSH SERPL-MCNC: 5.42 UU/ML — HIGH (ref 0.34–4.82)

## 2020-11-26 PROCEDURE — 71045 X-RAY EXAM CHEST 1 VIEW: CPT | Mod: 26

## 2020-11-26 RX ORDER — POTASSIUM CHLORIDE 20 MEQ
40 PACKET (EA) ORAL ONCE
Refills: 0 | Status: COMPLETED | OUTPATIENT
Start: 2020-11-26 | End: 2020-11-26

## 2020-11-26 RX ORDER — SODIUM CHLORIDE 9 MG/ML
1000 INJECTION, SOLUTION INTRAVENOUS ONCE
Refills: 0 | Status: COMPLETED | OUTPATIENT
Start: 2020-11-26 | End: 2020-11-26

## 2020-11-26 RX ORDER — MAGNESIUM SULFATE 500 MG/ML
2 VIAL (ML) INJECTION ONCE
Refills: 0 | Status: COMPLETED | OUTPATIENT
Start: 2020-11-26 | End: 2020-11-26

## 2020-11-26 RX ORDER — PIPERACILLIN AND TAZOBACTAM 4; .5 G/20ML; G/20ML
3.38 INJECTION, POWDER, LYOPHILIZED, FOR SOLUTION INTRAVENOUS EVERY 8 HOURS
Refills: 0 | Status: DISCONTINUED | OUTPATIENT
Start: 2020-11-26 | End: 2020-11-29

## 2020-11-26 RX ADMIN — ENOXAPARIN SODIUM 40 MILLIGRAM(S): 100 INJECTION SUBCUTANEOUS at 11:12

## 2020-11-26 RX ADMIN — Medication 40 MILLIEQUIVALENT(S): at 12:11

## 2020-11-26 RX ADMIN — Medication 2 MILLIGRAM(S): at 05:34

## 2020-11-26 RX ADMIN — Medication 2 MILLIGRAM(S): at 14:16

## 2020-11-26 RX ADMIN — SODIUM CHLORIDE 2000 MILLILITER(S): 9 INJECTION, SOLUTION INTRAVENOUS at 16:39

## 2020-11-26 RX ADMIN — CHLORHEXIDINE GLUCONATE 15 MILLILITER(S): 213 SOLUTION TOPICAL at 10:36

## 2020-11-26 RX ADMIN — Medication 101 MILLIGRAM(S): at 10:29

## 2020-11-26 RX ADMIN — Medication 2 MILLIGRAM(S): at 10:35

## 2020-11-26 RX ADMIN — PIPERACILLIN AND TAZOBACTAM 25 GRAM(S): 4; .5 INJECTION, POWDER, LYOPHILIZED, FOR SOLUTION INTRAVENOUS at 21:43

## 2020-11-26 RX ADMIN — SODIUM CHLORIDE 2000 MILLILITER(S): 9 INJECTION, SOLUTION INTRAVENOUS at 12:59

## 2020-11-26 RX ADMIN — PANTOPRAZOLE SODIUM 40 MILLIGRAM(S): 20 TABLET, DELAYED RELEASE ORAL at 10:28

## 2020-11-26 RX ADMIN — PIPERACILLIN AND TAZOBACTAM 25 GRAM(S): 4; .5 INJECTION, POWDER, LYOPHILIZED, FOR SOLUTION INTRAVENOUS at 18:24

## 2020-11-26 RX ADMIN — PIPERACILLIN AND TAZOBACTAM 25 GRAM(S): 4; .5 INJECTION, POWDER, LYOPHILIZED, FOR SOLUTION INTRAVENOUS at 10:29

## 2020-11-26 RX ADMIN — Medication 1 TABLET(S): at 10:28

## 2020-11-26 RX ADMIN — DEXMEDETOMIDINE HYDROCHLORIDE IN 0.9% SODIUM CHLORIDE 11.3 MICROGRAM(S)/KG/HR: 4 INJECTION INTRAVENOUS at 11:12

## 2020-11-26 RX ADMIN — Medication 2 MILLIGRAM(S): at 00:56

## 2020-11-26 RX ADMIN — DEXMEDETOMIDINE HYDROCHLORIDE IN 0.9% SODIUM CHLORIDE 11.3 MICROGRAM(S)/KG/HR: 4 INJECTION INTRAVENOUS at 21:51

## 2020-11-26 RX ADMIN — Medication 50 GRAM(S): at 13:25

## 2020-11-26 RX ADMIN — Medication 1 MILLIGRAM(S): at 10:28

## 2020-11-26 RX ADMIN — CHLORHEXIDINE GLUCONATE 1 APPLICATION(S): 213 SOLUTION TOPICAL at 06:04

## 2020-11-26 RX ADMIN — PROPOFOL 21.8 MICROGRAM(S)/KG/MIN: 10 INJECTION, EMULSION INTRAVENOUS at 07:46

## 2020-11-26 RX ADMIN — DEXMEDETOMIDINE HYDROCHLORIDE IN 0.9% SODIUM CHLORIDE 11.3 MICROGRAM(S)/KG/HR: 4 INJECTION INTRAVENOUS at 12:24

## 2020-11-26 RX ADMIN — Medication 2 MILLIGRAM(S): at 21:43

## 2020-11-26 RX ADMIN — CHLORHEXIDINE GLUCONATE 15 MILLILITER(S): 213 SOLUTION TOPICAL at 21:43

## 2020-11-26 NOTE — PROGRESS NOTE ADULT - SUBJECTIVE AND OBJECTIVE BOX
CC:  Patient is a 40y old  Male who presents with a chief complaint of Hypothermia  ETOH intoxication (24 Nov 2020 14:47)      HPI/BRIEF HOSPITAL COURSE:   40 with PMH ETOH abuse, frequent hospitalizations for ETOH withdrawal Pt seen and examined found down outside [address], intoxicated. pt was found to be hypercarbic and obtunded in the ER and was subsequently intubated       Events last 24 hours:   Pt requiring propofol gtt ON. Plan to transition back to precedex gtt this AM and start SAT/SBT trial. Started on standing Ativan PRN. Pt noted to have thick mucous secretions from inline suction cath in ETT. New LLL infiltrate on CXR this AM, febrile ON and WBC rising to 10.  Started on empiric zosyn with pending RVP, sputum cx, MSSA/MRSA PCR and urine Strep/Legionella, PCT pending         PAST MEDICAL & SURGICAL HISTORY:  No significant past surgical history      Allergies    No Known Allergies    Intolerances      FAMILY HISTORY:      Review of Systems:  ROS unable to be obtained pt intubated and sedated       Medications:  piperacillin/tazobactam IVPB.. 3.375 Gram(s) IV Intermittent every 8 hours        acetaminophen    Suspension .. 650 milliGRAM(s) Oral every 6 hours PRN  dexMEDEtomidine Infusion 0.5 MICROgram(s)/kG/Hr IV Continuous <Continuous>  LORazepam   Injectable 2 milliGRAM(s) IV Push every 2 hours PRN  LORazepam   Injectable 2 milliGRAM(s) IV Push every 4 hours  propofol Infusion 40 MICROgram(s)/kG/Min IV Continuous <Continuous>      enoxaparin Injectable 40 milliGRAM(s) SubCutaneous daily    pantoprazole  Injectable 40 milliGRAM(s) IV Push daily        folic acid 1 milliGRAM(s) Oral daily  multivitamin 1 Tablet(s) Oral daily  potassium chloride   Powder 40 milliEquivalent(s) Oral every 4 hours  thiamine IVPB 100 milliGRAM(s) IV Intermittent daily      chlorhexidine 0.12% Liquid 15 milliLiter(s) Oral Mucosa every 12 hours  chlorhexidine 2% Cloths 1 Application(s) Topical <User Schedule>        Mode: CPAP with PS  RR (machine): 0  FiO2: 40  PEEP: 5      ICU Vital Signs Last 24 Hrs  T(C): 38.3 (26 Nov 2020 10:22), Max: 38.7 (25 Nov 2020 23:00)  T(F): 100.9 (26 Nov 2020 10:22), Max: 101.7 (25 Nov 2020 23:00)  HR: 111 (26 Nov 2020 09:00) (44 - 111)  BP: 130/86 (26 Nov 2020 09:00) (92/58 - 160/138)  BP(mean): 95 (26 Nov 2020 09:00) (65 - 143)  ABP: --  ABP(mean): --  RR: --  SpO2: 98% (26 Nov 2020 09:00) (87% - 100%)    Vital Signs Last 24 Hrs  T(C): 38.3 (26 Nov 2020 10:22), Max: 38.7 (25 Nov 2020 23:00)  T(F): 100.9 (26 Nov 2020 10:22), Max: 101.7 (25 Nov 2020 23:00)  HR: 111 (26 Nov 2020 09:00) (44 - 111)  BP: 130/86 (26 Nov 2020 09:00) (92/58 - 160/138)  BP(mean): 95 (26 Nov 2020 09:00) (65 - 143)  RR: --  SpO2: 98% (26 Nov 2020 09:00) (87% - 100%)        I&O's Detail    25 Nov 2020 07:01  -  26 Nov 2020 07:00  --------------------------------------------------------  IN:    Free Water: 250 mL    Propofol: 103 mL  Total IN: 353 mL    OUT:    Indwelling Catheter - Urethral (mL): 3725 mL  Total OUT: 3725 mL    Total NET: -3372 mL            LABS:                        12.2   10.83 )-----------( 177      ( 26 Nov 2020 10:27 )             36.5     11-26    139  |  103  |  5<L>  ----------------------------<  96  3.6   |  29  |  0.46<L>    Ca    8.6      26 Nov 2020 10:26  Phos  3.0     11-26  Mg     1.7     11-26    TPro  5.9<L>  /  Alb  2.2<L>  /  TBili  0.5  /  DBili  x   /  AST  30  /  ALT  15  /  AlkPhos  106  11-25          CAPILLARY BLOOD GLUCOSE            CULTURES:  Culture Results:   No growth (11-23 @ 18:56)  Culture Results:   No growth to date. (11-23 @ 18:26)  Culture Results:   No growth to date. (11-23 @ 18:26)    piperacillin/tazobactam IVPB.. 3.375 Gram(s) IV Intermittent every 8 hours      Physical Examination:  General: No acute distress.  intubated and sedated   NEURO: awakens off sedation, sedated to a RASS -1 to 0, motor function 5/5 BL UE/LE  HEENT: Pupils equal, reactive to light.  Symmetric.  PULM: coarse BS at LLL base - CTA anteriorly. Thick mucous secretions from ETT    CVS: Regular rate and rhythm, no murmurs, rubs, or gallops  ABD: Soft, nondistended, nontender, normoactive bowel sounds, no masses  EXT: No edema, nontender  SKIN: Warm and well perfused, no rashes noted      EKG: NSR     RADIOLOGY:   < from: Xray Chest 1 View- PORTABLE-Urgent (Xray Chest 1 View- PORTABLE-Urgent .) (11.26.20 @ 09:40) >  IMPRESSION:  Dense infiltrate left base..      < end of copied text >        CENTRAL LINE: N          DATE INSERTED:                  GARG: N                        DATE INSERTED:                  A-LINE: N                       DATE INSERTED:                  GLOBAL ISSUE/BEST PRACTICE:  Analgesia: N/A  Sedation: precedex gtt and ativan   HOB elevation: yes  Stress ulcer prophylaxis: protonix   VTE prophylaxis: Lovenox SQ  Glycemic control: N/A  Nutrition: TF

## 2020-11-26 NOTE — PROGRESS NOTE ADULT - ASSESSMENT
ASSESSMENT   1. Acute hypercarbic respiratory failure   2. ETOH withdrawal with DT's   3. sepsis Lobar PNA      PLAN     NEURO:   -pt intubated and sedated on propofol gtt   -will start standing Ativan 2 mg q4h and transition to precedex gtt   -Ativan PRN   -CIWA protocol if extubated     PULM:    -Patient currently on full vent support  -Titrate settings to maintain SaO2 >90%, or pH >7.25  -Goal Vt 6-8 cc/kg of ideal body weight  -plateau pressure goal <30  -Peridex oral care and VAP prophylaxis with HOB 30 degrees   -plan to SAT/SBT this AM   -thick mucous secretions from inline cath in ETT   -Aggressive Chest PT   CV:   -Maintain MAP> 65     GI:    -TF held while SAT/SBT for potential extubation     :   -Check repeat labs and monitor renal function trend  -Monitor input and output  -Monitor and replace electrolytes as needed     ENDO:   -CRESENCIO    ID:   -thick mucous secretions from inline   -CXR this AM with new dense LLL infiltrate   -RVP, sputum cx, MSSA/MRSA PCR and urine Strep/Legionella, PCT pending   -empiric zosyn started   -ID c/s    HEME/DVT PPX:     ETHICS        CODE STATUS: Full Code       Care discussed with bedside provider and eICU attending.         Critical Care time: 50 mins assessing presenting problems of acute illness that poses high probability of life threatening deterioration or end organ damage/dysfunction.  Medical decision making including Initiating plan of care, reviewing data, reviewing radiology, direct patient bedside evaluation and interpretation of vital signs, any necessary ventilator management , discussion with multidisciplinary team, discussing goals of care with patient/family, all non inclusive of procedures

## 2020-11-27 LAB
ALBUMIN SERPL ELPH-MCNC: 1.9 G/DL — LOW (ref 3.3–5)
ALP SERPL-CCNC: 144 U/L — HIGH (ref 40–120)
ALT FLD-CCNC: 10 U/L — LOW (ref 12–78)
ANION GAP SERPL CALC-SCNC: 9 MMOL/L — SIGNIFICANT CHANGE UP (ref 5–17)
AST SERPL-CCNC: 18 U/L — SIGNIFICANT CHANGE UP (ref 15–37)
BASOPHILS # BLD AUTO: 0.02 K/UL — SIGNIFICANT CHANGE UP (ref 0–0.2)
BASOPHILS NFR BLD AUTO: 0.3 % — SIGNIFICANT CHANGE UP (ref 0–2)
BILIRUB SERPL-MCNC: 0.9 MG/DL — SIGNIFICANT CHANGE UP (ref 0.2–1.2)
BUN SERPL-MCNC: 9 MG/DL — SIGNIFICANT CHANGE UP (ref 7–23)
CALCIUM SERPL-MCNC: 8.4 MG/DL — LOW (ref 8.5–10.1)
CHLORIDE SERPL-SCNC: 102 MMOL/L — SIGNIFICANT CHANGE UP (ref 96–108)
CO2 SERPL-SCNC: 26 MMOL/L — SIGNIFICANT CHANGE UP (ref 22–31)
CREAT SERPL-MCNC: 0.66 MG/DL — SIGNIFICANT CHANGE UP (ref 0.5–1.3)
EOSINOPHIL # BLD AUTO: 0.3 K/UL — SIGNIFICANT CHANGE UP (ref 0–0.5)
EOSINOPHIL NFR BLD AUTO: 4.9 % — SIGNIFICANT CHANGE UP (ref 0–6)
GLUCOSE SERPL-MCNC: 87 MG/DL — SIGNIFICANT CHANGE UP (ref 70–99)
HCT VFR BLD CALC: 32.2 % — LOW (ref 39–50)
HGB BLD-MCNC: 10.6 G/DL — LOW (ref 13–17)
IMM GRANULOCYTES NFR BLD AUTO: 0.6 % — SIGNIFICANT CHANGE UP (ref 0–1.5)
LEGIONELLA AG UR QL: NEGATIVE — SIGNIFICANT CHANGE UP
LYMPHOCYTES # BLD AUTO: 0.92 K/UL — LOW (ref 1–3.3)
LYMPHOCYTES # BLD AUTO: 14.9 % — SIGNIFICANT CHANGE UP (ref 13–44)
MAGNESIUM SERPL-MCNC: 1.8 MG/DL — SIGNIFICANT CHANGE UP (ref 1.6–2.6)
MCHC RBC-ENTMCNC: 32.3 PG — SIGNIFICANT CHANGE UP (ref 27–34)
MCHC RBC-ENTMCNC: 32.9 GM/DL — SIGNIFICANT CHANGE UP (ref 32–36)
MCV RBC AUTO: 98.2 FL — SIGNIFICANT CHANGE UP (ref 80–100)
MONOCYTES # BLD AUTO: 0.22 K/UL — SIGNIFICANT CHANGE UP (ref 0–0.9)
MONOCYTES NFR BLD AUTO: 3.6 % — SIGNIFICANT CHANGE UP (ref 2–14)
MRSA PCR RESULT.: SIGNIFICANT CHANGE UP
NEUTROPHILS # BLD AUTO: 4.66 K/UL — SIGNIFICANT CHANGE UP (ref 1.8–7.4)
NEUTROPHILS NFR BLD AUTO: 75.7 % — SIGNIFICANT CHANGE UP (ref 43–77)
PHOSPHATE SERPL-MCNC: 3.8 MG/DL — SIGNIFICANT CHANGE UP (ref 2.5–4.5)
PLATELET # BLD AUTO: 152 K/UL — SIGNIFICANT CHANGE UP (ref 150–400)
POTASSIUM SERPL-MCNC: 3.4 MMOL/L — LOW (ref 3.5–5.3)
POTASSIUM SERPL-SCNC: 3.4 MMOL/L — LOW (ref 3.5–5.3)
PROT SERPL-MCNC: 5.9 GM/DL — LOW (ref 6–8.3)
RBC # BLD: 3.28 M/UL — LOW (ref 4.2–5.8)
RBC # FLD: 12.5 % — SIGNIFICANT CHANGE UP (ref 10.3–14.5)
S AUREUS DNA NOSE QL NAA+PROBE: SIGNIFICANT CHANGE UP
SODIUM SERPL-SCNC: 137 MMOL/L — SIGNIFICANT CHANGE UP (ref 135–145)
WBC # BLD: 6.16 K/UL — SIGNIFICANT CHANGE UP (ref 3.8–10.5)
WBC # FLD AUTO: 6.16 K/UL — SIGNIFICANT CHANGE UP (ref 3.8–10.5)

## 2020-11-27 RX ORDER — POTASSIUM CHLORIDE 20 MEQ
40 PACKET (EA) ORAL ONCE
Refills: 0 | Status: COMPLETED | OUTPATIENT
Start: 2020-11-27 | End: 2020-11-27

## 2020-11-27 RX ADMIN — PANTOPRAZOLE SODIUM 40 MILLIGRAM(S): 20 TABLET, DELAYED RELEASE ORAL at 08:48

## 2020-11-27 RX ADMIN — PIPERACILLIN AND TAZOBACTAM 25 GRAM(S): 4; .5 INJECTION, POWDER, LYOPHILIZED, FOR SOLUTION INTRAVENOUS at 05:39

## 2020-11-27 RX ADMIN — Medication 2 MILLIGRAM(S): at 17:22

## 2020-11-27 RX ADMIN — Medication 1 TABLET(S): at 09:14

## 2020-11-27 RX ADMIN — Medication 2 MILLIGRAM(S): at 22:41

## 2020-11-27 RX ADMIN — PIPERACILLIN AND TAZOBACTAM 25 GRAM(S): 4; .5 INJECTION, POWDER, LYOPHILIZED, FOR SOLUTION INTRAVENOUS at 22:41

## 2020-11-27 RX ADMIN — Medication 101 MILLIGRAM(S): at 10:05

## 2020-11-27 RX ADMIN — ENOXAPARIN SODIUM 40 MILLIGRAM(S): 100 INJECTION SUBCUTANEOUS at 14:58

## 2020-11-27 RX ADMIN — CHLORHEXIDINE GLUCONATE 1 APPLICATION(S): 213 SOLUTION TOPICAL at 05:36

## 2020-11-27 RX ADMIN — PIPERACILLIN AND TAZOBACTAM 25 GRAM(S): 4; .5 INJECTION, POWDER, LYOPHILIZED, FOR SOLUTION INTRAVENOUS at 15:38

## 2020-11-27 RX ADMIN — Medication 2 MILLIGRAM(S): at 01:41

## 2020-11-27 RX ADMIN — Medication 2 MILLIGRAM(S): at 09:14

## 2020-11-27 RX ADMIN — Medication 40 MILLIEQUIVALENT(S): at 16:09

## 2020-11-27 RX ADMIN — DEXMEDETOMIDINE HYDROCHLORIDE IN 0.9% SODIUM CHLORIDE 11.3 MICROGRAM(S)/KG/HR: 4 INJECTION INTRAVENOUS at 08:26

## 2020-11-27 RX ADMIN — Medication 2 MILLIGRAM(S): at 14:58

## 2020-11-27 RX ADMIN — CHLORHEXIDINE GLUCONATE 15 MILLILITER(S): 213 SOLUTION TOPICAL at 09:14

## 2020-11-27 RX ADMIN — Medication 2 MILLIGRAM(S): at 05:39

## 2020-11-27 RX ADMIN — Medication 1 MILLIGRAM(S): at 09:14

## 2020-11-27 NOTE — PROGRESS NOTE ADULT - SUBJECTIVE AND OBJECTIVE BOX
CC:  Patient is a 40y old  Male who presents with a chief complaint of Hypothermia  ETOH intoxication (24 Nov 2020 14:47)      HPI/BRIEF HOSPITAL COURSE:   40 with PMH ETOH abuse, frequent hospitalizations for ETOH withdrawal Pt seen and examined found down outside [employer], intoxicated. pt was found to be hypercarbic and obtunded in the ER and was subsequently intubated     Events last 24 hours:   Pt placed back on precedex gtt last night, pt on ativan q4h, pt weaning well and more awake and following commands this AM - pt was extubated to NC this afternoon.     PAST MEDICAL & SURGICAL HISTORY:  No significant past surgical history      Allergies    No Known Allergies    Intolerances      FAMILY HISTORY:      Review of Systems:  ROS unable to be obtained 2/2 to [facility]       Medications:  piperacillin/tazobactam IVPB.. 3.375 Gram(s) IV Intermittent every 8 hours        acetaminophen    Suspension .. 650 milliGRAM(s) Oral every 6 hours PRN  dexMEDEtomidine Infusion 0.5 MICROgram(s)/kG/Hr IV Continuous <Continuous>  LORazepam   Injectable 2 milliGRAM(s) IV Push every 4 hours  LORazepam   Injectable 2 milliGRAM(s) IV Push every 2 hours PRN  propofol Infusion 40 MICROgram(s)/kG/Min IV Continuous <Continuous>      enoxaparin Injectable 40 milliGRAM(s) SubCutaneous daily    pantoprazole  Injectable 40 milliGRAM(s) IV Push daily        folic acid 1 milliGRAM(s) Oral daily  multivitamin 1 Tablet(s) Oral daily  potassium chloride   Powder 40 milliEquivalent(s) Oral once  thiamine IVPB 100 milliGRAM(s) IV Intermittent daily      chlorhexidine 2% Cloths 1 Application(s) Topical <User Schedule>        Mode: CPAP with PS  FiO2: 40  PEEP: 5  PS: 10      ICU Vital Signs Last 24 Hrs  T(C): 37.6 (27 Nov 2020 15:00), Max: 37.6 (27 Nov 2020 10:11)  T(F): 99.6 (27 Nov 2020 15:00), Max: 99.7 (27 Nov 2020 10:11)  HR: 86 (27 Nov 2020 15:00) (58 - 103)  BP: 115/68 (27 Nov 2020 15:00) (100/58 - 133/82)  BP(mean): 79 (27 Nov 2020 15:00) (67 - 94)  ABP: --  ABP(mean): --  RR: --  SpO2: 98% (27 Nov 2020 15:00) (96% - 100%)    Vital Signs Last 24 Hrs  T(C): 37.6 (27 Nov 2020 15:00), Max: 37.6 (27 Nov 2020 10:11)  T(F): 99.6 (27 Nov 2020 15:00), Max: 99.7 (27 Nov 2020 10:11)  HR: 86 (27 Nov 2020 15:00) (58 - 103)  BP: 115/68 (27 Nov 2020 15:00) (100/58 - 133/82)  BP(mean): 79 (27 Nov 2020 15:00) (67 - 94)  RR: --  SpO2: 98% (27 Nov 2020 15:00) (96% - 100%)        I&O's Detail    26 Nov 2020 07:01  -  27 Nov 2020 07:00  --------------------------------------------------------  IN:    Dexmedetomidine: 95 mL    IV PiggyBack: 350 mL    Lactated Ringers Bolus: 2000 mL    Oral Fluid: 320 mL    Propofol: 100 mL  Total IN: 2865 mL    OUT:    Indwelling Catheter - Urethral (mL): 400 mL    Intermittent Catheterization - Urethral (mL): 1400 mL  Total OUT: 1800 mL    Total NET: 1065 mL      27 Nov 2020 07:01  -  27 Nov 2020 16:08  --------------------------------------------------------  IN:    Dexmedetomidine: 32 mL  Total IN: 32 mL    OUT:    Intermittent Catheterization - Urethral (mL): 600 mL  Total OUT: 600 mL    Total NET: -568 mL            LABS:                        10.6   6.16  )-----------( 152      ( 27 Nov 2020 10:49 )             32.2     11-27    137  |  102  |  9   ----------------------------<  87  3.4<L>   |  26  |  0.66    Ca    8.4<L>      27 Nov 2020 10:49  Phos  3.8     11-27  Mg     1.8     11-27    TPro  5.9<L>  /  Alb  1.9<L>  /  TBili  0.9  /  DBili  x   /  AST  18  /  ALT  10<L>  /  AlkPhos  144<H>  11-27          CAPILLARY BLOOD GLUCOSE      POCT Blood Glucose.: 94 mg/dL (27 Nov 2020 01:51)        CULTURES:  Culture Results:   No growth (11-23 @ 18:56)  Culture Results:   No growth to date. (11-23 @ 18:26)  Culture Results:   No growth to date. (11-23 @ 18:26)    piperacillin/tazobactam IVPB.. 3.375 Gram(s) IV Intermittent every 8 hours      Physical Examination:  General: No acute distress.  Alert, follows commands, interactive, nonfocal  NEURO: Alert and following commands, motor function 5/5 BL UE/LE  HEENT: Pupils equal, reactive to light.  Symmetric.  PULM: coarse BS throughout   CVS: Regular rate and rhythm, no murmurs, rubs, or gallops  ABD: Soft, nondistended, nontender, normoactive bowel sounds, no masses  EXT: No edema, nontender  SKIN: Warm and well perfused, no rashes noted      EKG: NSR     RADIOLOGY: < from: Xray Chest 1 View- PORTABLE-Urgent (Xray Chest 1 View- PORTABLE-Urgent .) (11.26.20 @ 09:40) >  IMPRESSION:  Dense infiltrate left base..    < end of copied text >        CENTRAL LINE: N          DATE INSERTED:                  GARG: N                        DATE INSERTED:                  A-LINE: N                       DATE INSERTED:

## 2020-11-27 NOTE — CONSULT NOTE ADULT - ASSESSMENT
40M with PMHx of EtOH abuse with admission in past for EtOH intoxication/withdrawal admitted on 11/23 for evaluation of hypothermia after being found on ground behind a store. Patient emergently intubated upon admission and blood alcohol was greater than 500. History per medical record.     1. Patient admitted with respiratory failure most likely due to aspiration pneumonia; also noted with leukocytosis most likely reactive to infection  - vent per icu  - follow up cultures   - serial cbc and monitor temperature   - reviewed prior medical records to evaluate for resistant or atypical pathogens   - agree with zosyn as ordered, this will cover anaerobes, gram negative rods including Pseudomonas  - iv hydration and supportive care   2. other issues: per medicine

## 2020-11-27 NOTE — PROGRESS NOTE ADULT - ASSESSMENT
ASSESSMENT   1. Acute hypercarbic respiratory failure   2. ETOH withdrawal with DT's   3. sepsis Lobar PNA      PLAN     NEURO:   -will start standing Ativan 2 mg q4h   -Ativan PRN   -CIWA protocol if extubated   -low dose precedex gtt     PULM:    -pt SBT well and awake this AM - pt successfully extubated to nasal canula   -Aggressive Chest PT   CV:   -Maintain MAP> 65     GI:    -bedside s/s pending diet     :   -Check repeat labs and monitor renal function trend  -Monitor input and output  -Monitor and replace electrolytes as needed     ENDO:   -CRESENCIO    ID:   -thick mucous secretions from inline   -CXR this AM with new dense LLL infiltrate   -RVP, MSSA/MRSA PCR and urine Strep/Legionella (-)  -Sputum with GNR and rare GPC in pairs  -empiric zosyn   -ID c/s    HEME/DVT PPX:   lovenox sq dvt ppx     ETHICS        CODE STATUS: Full Code       Care discussed with bedside provider and eICU attending.         Critical Care time: 50 mins assessing presenting problems of acute illness that poses high probability of life threatening deterioration or end organ damage/dysfunction.  Medical decision making including Initiating plan of care, reviewing data, reviewing radiology, direct patient bedside evaluation and interpretation of vital signs, any necessary ventilator management , discussion with multidisciplinary team, discussing goals of care with patient/family, all non inclusive of procedures

## 2020-11-27 NOTE — CONSULT NOTE ADULT - SUBJECTIVE AND OBJECTIVE BOX
Patient is a 40y old  Male who presents with a chief complaint of Hypothermia  ETOH intoxication (2020 14:47)    HPI:  40M with PMHx of EtOH abuse with admission in past for EtOH intoxication/withdrawal admitted on  for evaluation of hypothermia after being found on ground behind a store. Patient emergently intubated upon admission and blood alcohol was greater than 500. History per medical record.       PMH: as above  PSH: as above  Meds: per reconciliation sheet, noted below  MEDICATIONS  (STANDING):  chlorhexidine 2% Cloths 1 Application(s) Topical <User Schedule>  dexMEDEtomidine Infusion 0.5 MICROgram(s)/kG/Hr (11.3 mL/Hr) IV Continuous <Continuous>  enoxaparin Injectable 40 milliGRAM(s) SubCutaneous daily  folic acid 1 milliGRAM(s) Oral daily  LORazepam   Injectable 2 milliGRAM(s) IV Push every 4 hours  multivitamin 1 Tablet(s) Oral daily  pantoprazole  Injectable 40 milliGRAM(s) IV Push daily  piperacillin/tazobactam IVPB.. 3.375 Gram(s) IV Intermittent every 8 hours  propofol Infusion 40 MICROgram(s)/kG/Min (21.8 mL/Hr) IV Continuous <Continuous>  thiamine IVPB 100 milliGRAM(s) IV Intermittent daily    MEDICATIONS  (PRN):  acetaminophen    Suspension .. 650 milliGRAM(s) Oral every 6 hours PRN Temp greater or equal to 38C (100.4F)  LORazepam   Injectable 2 milliGRAM(s) IV Push every 2 hours PRN Agitation    Allergies    No Known Allergies    Intolerances      Social: no smoking, positive alcohol, no illegal drugs; no recent travel, no exposure to TB  FAMILY HISTORY:       Vital Signs Last 24 Hrs  T(C): 37.6 (2020 10:11), Max: 37.6 (2020 10:11)  T(F): 99.7 (2020 10:11), Max: 99.7 (2020 10:11)  HR: 86 (2020 15:00) (58 - 103)  BP: 115/68 (2020 15:00) (88/53 - 133/82)  BP(mean): 79 (2020 15:00) (61 - 94)  RR: --  SpO2: 98% (2020 15:00) (96% - 100%)  Daily     Daily Weight in k.8 (2020 05:47)    PE:    Constitutional: frail looking  HEENT: NC/AT, orally intubated  Neck: supple; thyroid not palpable  Back: no tenderness  Respiratory: respiratory effort normal; clear to auscultation  Cardiovascular: S1S2 regular, no murmurs  Abdomen: soft, not tender, not distended, positive BS; no liver or spleen organomegaly  Genitourinary: no suprapubic tenderness  Musculoskeletal: no muscle tenderness, no joint swelling or tenderness  Neurological/ Psychiatric: intubated  Skin: no rashes; no palpable lesions; hyperkeratosis of soles of feet    Labs: all available labs reviewed                        10.6   6.16  )-----------( 152      ( 2020 10:49 )             32.2         137  |  102  |  9   ----------------------------<  87  3.4<L>   |  26  |  0.66    Ca    8.4<L>      2020 10:49  Phos  3.8       Mg     1.8         TPro  5.9<L>  /  Alb  1.9<L>  /  TBili  0.9  /  DBili  x   /  AST  18  /  ALT  10<L>  /  AlkPhos  144<H>       LIVER FUNCTIONS - ( 2020 10:49 )  Alb: 1.9 g/dL / Pro: 5.9 gm/dL / ALK PHOS: 144 U/L / ALT: 10 U/L / AST: 18 U/L / GGT: x           < from: Xray Chest 1 View- PORTABLE-Urgent (Xray Chest 1 View- PORTABLE-Urgent .) (20 @ 09:40) >  MPRESSION:  Dense infiltrate left base..    < end of copied text >        Radiology: all available radiological tests reviewed    Advanced directives addressed: full resuscitation

## 2020-11-28 LAB
ALBUMIN SERPL ELPH-MCNC: 2.1 G/DL — LOW (ref 3.3–5)
ALP SERPL-CCNC: 156 U/L — HIGH (ref 40–120)
ALT FLD-CCNC: 13 U/L — SIGNIFICANT CHANGE UP (ref 12–78)
ANION GAP SERPL CALC-SCNC: 7 MMOL/L — SIGNIFICANT CHANGE UP (ref 5–17)
AST SERPL-CCNC: 25 U/L — SIGNIFICANT CHANGE UP (ref 15–37)
BILIRUB SERPL-MCNC: 0.6 MG/DL — SIGNIFICANT CHANGE UP (ref 0.2–1.2)
BUN SERPL-MCNC: 8 MG/DL — SIGNIFICANT CHANGE UP (ref 7–23)
CALCIUM SERPL-MCNC: 8.8 MG/DL — SIGNIFICANT CHANGE UP (ref 8.5–10.1)
CHLORIDE SERPL-SCNC: 101 MMOL/L — SIGNIFICANT CHANGE UP (ref 96–108)
CO2 SERPL-SCNC: 31 MMOL/L — SIGNIFICANT CHANGE UP (ref 22–31)
CREAT SERPL-MCNC: 0.61 MG/DL — SIGNIFICANT CHANGE UP (ref 0.5–1.3)
CULTURE RESULTS: SIGNIFICANT CHANGE UP
GLUCOSE SERPL-MCNC: 71 MG/DL — SIGNIFICANT CHANGE UP (ref 70–99)
HCT VFR BLD CALC: 35.8 % — LOW (ref 39–50)
HGB BLD-MCNC: 12 G/DL — LOW (ref 13–17)
MAGNESIUM SERPL-MCNC: 1.6 MG/DL — SIGNIFICANT CHANGE UP (ref 1.6–2.6)
MCHC RBC-ENTMCNC: 32.4 PG — SIGNIFICANT CHANGE UP (ref 27–34)
MCHC RBC-ENTMCNC: 33.5 GM/DL — SIGNIFICANT CHANGE UP (ref 32–36)
MCV RBC AUTO: 96.8 FL — SIGNIFICANT CHANGE UP (ref 80–100)
PHOSPHATE SERPL-MCNC: 4.1 MG/DL — SIGNIFICANT CHANGE UP (ref 2.5–4.5)
PLATELET # BLD AUTO: 156 K/UL — SIGNIFICANT CHANGE UP (ref 150–400)
POTASSIUM SERPL-MCNC: 3.2 MMOL/L — LOW (ref 3.5–5.3)
POTASSIUM SERPL-SCNC: 3.2 MMOL/L — LOW (ref 3.5–5.3)
PROT SERPL-MCNC: 6.8 GM/DL — SIGNIFICANT CHANGE UP (ref 6–8.3)
RBC # BLD: 3.7 M/UL — LOW (ref 4.2–5.8)
RBC # FLD: 12.1 % — SIGNIFICANT CHANGE UP (ref 10.3–14.5)
SODIUM SERPL-SCNC: 139 MMOL/L — SIGNIFICANT CHANGE UP (ref 135–145)
SPECIMEN SOURCE: SIGNIFICANT CHANGE UP
WBC # BLD: 10.6 K/UL — HIGH (ref 3.8–10.5)
WBC # FLD AUTO: 10.6 K/UL — HIGH (ref 3.8–10.5)

## 2020-11-28 RX ORDER — FOLIC ACID 0.8 MG
1 TABLET ORAL DAILY
Refills: 0 | Status: DISCONTINUED | OUTPATIENT
Start: 2020-11-28 | End: 2020-12-01

## 2020-11-28 RX ORDER — POTASSIUM CHLORIDE 20 MEQ
40 PACKET (EA) ORAL EVERY 4 HOURS
Refills: 0 | Status: COMPLETED | OUTPATIENT
Start: 2020-11-28 | End: 2020-11-28

## 2020-11-28 RX ORDER — THIAMINE MONONITRATE (VIT B1) 100 MG
100 TABLET ORAL DAILY
Refills: 0 | Status: COMPLETED | OUTPATIENT
Start: 2020-11-28 | End: 2020-11-30

## 2020-11-28 RX ORDER — MAGNESIUM SULFATE 500 MG/ML
2 VIAL (ML) INJECTION
Refills: 0 | Status: COMPLETED | OUTPATIENT
Start: 2020-11-28 | End: 2020-11-28

## 2020-11-28 RX ADMIN — Medication 100 MILLIGRAM(S): at 09:50

## 2020-11-28 RX ADMIN — Medication 40 MILLIEQUIVALENT(S): at 13:50

## 2020-11-28 RX ADMIN — ENOXAPARIN SODIUM 40 MILLIGRAM(S): 100 INJECTION SUBCUTANEOUS at 11:29

## 2020-11-28 RX ADMIN — Medication 2 MILLIGRAM(S): at 02:12

## 2020-11-28 RX ADMIN — Medication 40 MILLIEQUIVALENT(S): at 09:50

## 2020-11-28 RX ADMIN — PIPERACILLIN AND TAZOBACTAM 25 GRAM(S): 4; .5 INJECTION, POWDER, LYOPHILIZED, FOR SOLUTION INTRAVENOUS at 13:50

## 2020-11-28 RX ADMIN — CHLORHEXIDINE GLUCONATE 1 APPLICATION(S): 213 SOLUTION TOPICAL at 06:45

## 2020-11-28 RX ADMIN — Medication 50 MILLIGRAM(S): at 18:42

## 2020-11-28 RX ADMIN — PIPERACILLIN AND TAZOBACTAM 25 GRAM(S): 4; .5 INJECTION, POWDER, LYOPHILIZED, FOR SOLUTION INTRAVENOUS at 06:38

## 2020-11-28 RX ADMIN — Medication 50 MILLIGRAM(S): at 09:18

## 2020-11-28 RX ADMIN — Medication 50 MILLIGRAM(S): at 13:50

## 2020-11-28 RX ADMIN — PIPERACILLIN AND TAZOBACTAM 25 GRAM(S): 4; .5 INJECTION, POWDER, LYOPHILIZED, FOR SOLUTION INTRAVENOUS at 21:32

## 2020-11-28 RX ADMIN — Medication 1 MILLIGRAM(S): at 09:50

## 2020-11-28 RX ADMIN — Medication 1 TABLET(S): at 09:50

## 2020-11-28 RX ADMIN — Medication 50 GRAM(S): at 09:54

## 2020-11-28 RX ADMIN — Medication 2 MILLIGRAM(S): at 06:38

## 2020-11-28 RX ADMIN — Medication 50 GRAM(S): at 11:30

## 2020-11-28 NOTE — PROGRESS NOTE ADULT - ASSESSMENT
A:    40yMale  HD # 5  FULL CODE    Here for:    1. AMS/obtundation 2/2  2. Acute hypercarbic resp failure, and  3. Alcohol intoxication  4. Alcohol withdrawal syndrome  5. Hypokalemia  6. Hypomagnesemia  7. PNA, aspiration    P:    Extubated.  Improving.     Improved MIRTA symptoms. d/c precedex drip (off for several hours), d/c standing ativan. Librium taper 50mg PO q6h, taper over next 96 hours. Ativan PRN CIWA > 8.  HD monitoring, hemodynamics stable.   IS, OOB as able.   Start regular diet.   Broad spectrum abx zosyn 3.375g IV TID extended infusion for aspiration PNA, 7 day course, end date entered for 12/3.   VTE ppx lovenox, GI ppx protonix PO.  MVI, thiamine, folate PO.  SW consult for SBIRT.  OOB to chair, ambulate.   f/u AM labs, replete lytes PRN.    Dispo: Cont care. Pt clear for XF to medical bed today.      A:    40yMale  HD # 5  FULL CODE    Here for:    1. AMS/obtundation 2/2  2. Acute hypercarbic resp failure, and  3. Alcohol intoxication  4. Alcohol withdrawal syndrome  5. Hypokalemia  6. Hypomagnesemia  7. PNA, aspiration    P:    Extubated.  Improving.     Improved MIRTA symptoms. d/c precedex drip (off for several hours), d/c standing ativan. Librium taper 50mg PO q6h, taper over next 96 hours. Ativan PRN CIWA > 8.  HD monitoring, hemodynamics stable.   IS, OOB as able.   Start regular diet.   Broad spectrum abx zosyn 3.375g IV TID extended infusion for aspiration PNA, 7 day course, end date entered for 12/3.   VTE ppx lovenox, GI ppx protonix PO.  MVI, thiamine, folate PO.  SW consult for SBIRT.  OOB to chair, ambulate.   f/u AM labs, replete lytes PRN.    Dispo: Cont care. Pt clear for XF to medical bed today. s/o to Dr Villagran

## 2020-11-28 NOTE — PROGRESS NOTE ADULT - SUBJECTIVE AND OBJECTIVE BOX
ICU Progress Note    HPI:    S:    Pt seen and examined  HD # 5  FULL CODE  PMHx alcoholism  Pt here for found down outside [employer], intoxicated  Multiple admission here for same with similar presentations  Intubated in ER for obtundation, hypercarbia    11/24 AM: Remains intubated. On versed drip.   11/25 AM: Remains intubated. Agitation overnight requiring additional ativan dosing and increase in precedex drip.   11/28 AM: Extubated yesterday. Off precedex drip.    ROS: No complaints at this time      Allergies    No Known Allergies    Intolerances        MEDICATIONS  (STANDING):  chlordiazePOXIDE   Oral   chlorhexidine 2% Cloths 1 Application(s) Topical <User Schedule>  enoxaparin Injectable 40 milliGRAM(s) SubCutaneous daily  folic acid 1 milliGRAM(s) Oral daily  folic acid 1 milliGRAM(s) Oral daily  multivitamin 1 Tablet(s) Oral daily  piperacillin/tazobactam IVPB.. 3.375 Gram(s) IV Intermittent every 8 hours  thiamine 100 milliGRAM(s) Oral daily    MEDICATIONS  (PRN):  acetaminophen    Suspension .. 650 milliGRAM(s) Oral every 6 hours PRN Temp greater or equal to 38C (100.4F)  LORazepam   Injectable 2 milliGRAM(s) IV Push every 1 hour PRN CIWA-Ar score 8 or greater      Drug Dosing Weight  Height (cm): 170.2 (23 Nov 2020 17:48)  Weight (kg): 90.7 (23 Nov 2020 17:48)  BMI (kg/m2): 31.3 (23 Nov 2020 17:48)  BSA (m2): 2.02 (23 Nov 2020 17:48)        PAST MEDICAL & SURGICAL HISTORY:  No significant past surgical history        FAMILY HISTORY:          ROS: See HPI; otherwise, all systems reviewed and negative.    O:    ICU Vital Signs Last 24 Hrs  T(C): 36.8 (28 Nov 2020 06:01), Max: 37.9 (27 Nov 2020 22:49)  T(F): 98.3 (28 Nov 2020 06:01), Max: 100.2 (27 Nov 2020 22:49)  HR: 59 (28 Nov 2020 07:00) (57 - 89)  BP: 128/76 (28 Nov 2020 07:00) (112/64 - 152/88)  BP(mean): 88 (28 Nov 2020 07:00) (72 - 122)  ABP: --  ABP(mean): --  RR: --  SpO2: 98% (28 Nov 2020 07:00) (96% - 100%)          I&O's Detail    27 Nov 2020 07:01  -  28 Nov 2020 07:00  --------------------------------------------------------  IN:    Dexmedetomidine: 32 mL  Total IN: 32 mL    OUT:    Intermittent Catheterization - Urethral (mL): 600 mL    Voided (mL): 900 mL  Total OUT: 1500 mL    Total NET: -1468 mL          Mode: CPAP with PS  FiO2: 40  PEEP: 5  PS: 10      PE:    Adult M lying in bed  + abrasion to bridge of nose, no other stigmata of trauma noted  No JVD trachea midline  S1S2+ RRR no M/R/G  CTA B/L good BS B/L  Abd soft NTND  No LE swelling/edema noted  Awake; sleepy but arousable, follows commands    LABS:    CBC Full  -  ( 28 Nov 2020 06:40 )  WBC Count : 10.60 K/uL  RBC Count : 3.70 M/uL  Hemoglobin : 12.0 g/dL  Hematocrit : 35.8 %  Platelet Count - Automated : 156 K/uL  Mean Cell Volume : 96.8 fl  Mean Cell Hemoglobin : 32.4 pg  Mean Cell Hemoglobin Concentration : 33.5 gm/dL  Auto Neutrophil # : x  Auto Lymphocyte # : x  Auto Monocyte # : x  Auto Eosinophil # : x  Auto Basophil # : x  Auto Neutrophil % : x  Auto Lymphocyte % : x  Auto Monocyte % : x  Auto Eosinophil % : x  Auto Basophil % : x    11-27    137  |  102  |  9   ----------------------------<  87  3.4<L>   |  26  |  0.66    Ca    8.4<L>      27 Nov 2020 10:49  Phos  3.8     11-27  Mg     1.8     11-27    TPro  5.9<L>  /  Alb  1.9<L>  /  TBili  0.9  /  DBili  x   /  AST  18  /  ALT  10<L>  /  AlkPhos  144<H>  11-27        CAPILLARY BLOOD GLUCOSE            LIVER FUNCTIONS - ( 27 Nov 2020 10:49 )  Alb: 1.9 g/dL / Pro: 5.9 gm/dL / ALK PHOS: 144 U/L / ALT: 10 U/L / AST: 18 U/L / GGT: x

## 2020-11-29 LAB
ANION GAP SERPL CALC-SCNC: 5 MMOL/L — SIGNIFICANT CHANGE UP (ref 5–17)
BUN SERPL-MCNC: 6 MG/DL — LOW (ref 7–23)
CALCIUM SERPL-MCNC: 8.9 MG/DL — SIGNIFICANT CHANGE UP (ref 8.5–10.1)
CHLORIDE SERPL-SCNC: 103 MMOL/L — SIGNIFICANT CHANGE UP (ref 96–108)
CO2 SERPL-SCNC: 34 MMOL/L — HIGH (ref 22–31)
CREAT SERPL-MCNC: 0.75 MG/DL — SIGNIFICANT CHANGE UP (ref 0.5–1.3)
CULTURE RESULTS: SIGNIFICANT CHANGE UP
CULTURE RESULTS: SIGNIFICANT CHANGE UP
GLUCOSE SERPL-MCNC: 86 MG/DL — SIGNIFICANT CHANGE UP (ref 70–99)
HCT VFR BLD CALC: 35.1 % — LOW (ref 39–50)
HGB BLD-MCNC: 11.5 G/DL — LOW (ref 13–17)
MAGNESIUM SERPL-MCNC: 2.1 MG/DL — SIGNIFICANT CHANGE UP (ref 1.6–2.6)
MCHC RBC-ENTMCNC: 32.2 PG — SIGNIFICANT CHANGE UP (ref 27–34)
MCHC RBC-ENTMCNC: 32.8 GM/DL — SIGNIFICANT CHANGE UP (ref 32–36)
MCV RBC AUTO: 98.3 FL — SIGNIFICANT CHANGE UP (ref 80–100)
PLATELET # BLD AUTO: 162 K/UL — SIGNIFICANT CHANGE UP (ref 150–400)
POTASSIUM SERPL-MCNC: 3.4 MMOL/L — LOW (ref 3.5–5.3)
POTASSIUM SERPL-SCNC: 3.4 MMOL/L — LOW (ref 3.5–5.3)
RBC # BLD: 3.57 M/UL — LOW (ref 4.2–5.8)
RBC # FLD: 11.9 % — SIGNIFICANT CHANGE UP (ref 10.3–14.5)
SODIUM SERPL-SCNC: 142 MMOL/L — SIGNIFICANT CHANGE UP (ref 135–145)
SPECIMEN SOURCE: SIGNIFICANT CHANGE UP
SPECIMEN SOURCE: SIGNIFICANT CHANGE UP
WBC # BLD: 5.85 K/UL — SIGNIFICANT CHANGE UP (ref 3.8–10.5)
WBC # FLD AUTO: 5.85 K/UL — SIGNIFICANT CHANGE UP (ref 3.8–10.5)

## 2020-11-29 PROCEDURE — 99233 SBSQ HOSP IP/OBS HIGH 50: CPT

## 2020-11-29 RX ORDER — LIDOCAINE 4 G/100G
1 CREAM TOPICAL DAILY
Refills: 0 | Status: DISCONTINUED | OUTPATIENT
Start: 2020-11-29 | End: 2020-12-01

## 2020-11-29 RX ORDER — POTASSIUM CHLORIDE 20 MEQ
40 PACKET (EA) ORAL EVERY 4 HOURS
Refills: 0 | Status: COMPLETED | OUTPATIENT
Start: 2020-11-29 | End: 2020-11-29

## 2020-11-29 RX ADMIN — Medication 50 MILLIGRAM(S): at 22:46

## 2020-11-29 RX ADMIN — PIPERACILLIN AND TAZOBACTAM 25 GRAM(S): 4; .5 INJECTION, POWDER, LYOPHILIZED, FOR SOLUTION INTRAVENOUS at 13:27

## 2020-11-29 RX ADMIN — ENOXAPARIN SODIUM 40 MILLIGRAM(S): 100 INJECTION SUBCUTANEOUS at 11:03

## 2020-11-29 RX ADMIN — Medication 1 TABLET(S): at 09:02

## 2020-11-29 RX ADMIN — Medication 100 MILLIGRAM(S): at 09:02

## 2020-11-29 RX ADMIN — LIDOCAINE 1 PATCH: 4 CREAM TOPICAL at 19:00

## 2020-11-29 RX ADMIN — Medication 1 MILLIGRAM(S): at 09:03

## 2020-11-29 RX ADMIN — Medication 40 MILLIEQUIVALENT(S): at 16:23

## 2020-11-29 RX ADMIN — LIDOCAINE 1 PATCH: 4 CREAM TOPICAL at 16:39

## 2020-11-29 RX ADMIN — Medication 50 MILLIGRAM(S): at 13:28

## 2020-11-29 RX ADMIN — CHLORHEXIDINE GLUCONATE 1 APPLICATION(S): 213 SOLUTION TOPICAL at 05:50

## 2020-11-29 RX ADMIN — PIPERACILLIN AND TAZOBACTAM 25 GRAM(S): 4; .5 INJECTION, POWDER, LYOPHILIZED, FOR SOLUTION INTRAVENOUS at 05:51

## 2020-11-29 RX ADMIN — Medication 50 MILLIGRAM(S): at 00:02

## 2020-11-29 RX ADMIN — Medication 1 TABLET(S): at 17:31

## 2020-11-29 RX ADMIN — Medication 40 MILLIEQUIVALENT(S): at 22:46
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
## 2020-11-30 LAB — S PNEUM AG UR QL: NEGATIVE — SIGNIFICANT CHANGE UP

## 2020-11-30 PROCEDURE — 73560 X-RAY EXAM OF KNEE 1 OR 2: CPT | Mod: 26,LT

## 2020-11-30 PROCEDURE — 99239 HOSP IP/OBS DSCHRG MGMT >30: CPT

## 2020-11-30 RX ORDER — LIDOCAINE 4 G/100G
1 CREAM TOPICAL
Qty: 1 | Refills: 0
Start: 2020-11-30 | End: 2020-12-06

## 2020-11-30 RX ADMIN — CHLORHEXIDINE GLUCONATE 1 APPLICATION(S): 213 SOLUTION TOPICAL at 05:59

## 2020-11-30 RX ADMIN — LIDOCAINE 1 PATCH: 4 CREAM TOPICAL at 18:40

## 2020-11-30 RX ADMIN — Medication 1 TABLET(S): at 05:58

## 2020-11-30 RX ADMIN — Medication 50 MILLIGRAM(S): at 05:58

## 2020-11-30 RX ADMIN — LIDOCAINE 1 PATCH: 4 CREAM TOPICAL at 22:49

## 2020-11-30 RX ADMIN — Medication 100 MILLIGRAM(S): at 09:21

## 2020-11-30 RX ADMIN — Medication 50 MILLIGRAM(S): at 18:39

## 2020-11-30 RX ADMIN — LIDOCAINE 1 PATCH: 4 CREAM TOPICAL at 09:20

## 2020-11-30 RX ADMIN — Medication 1 TABLET(S): at 09:21

## 2020-11-30 RX ADMIN — Medication 1 MILLIGRAM(S): at 09:21

## 2020-11-30 RX ADMIN — Medication 1 TABLET(S): at 18:39

## 2020-11-30 RX ADMIN — LIDOCAINE 1 PATCH: 4 CREAM TOPICAL at 04:00

## 2020-11-30 RX ADMIN — ENOXAPARIN SODIUM 40 MILLIGRAM(S): 100 INJECTION SUBCUTANEOUS at 09:20

## 2020-11-30 NOTE — PHYSICAL THERAPY INITIAL EVALUATION ADULT - PERTINENT HX OF CURRENT PROBLEM, REHAB EVAL
40M with PMHx of EtOH abuse with admission in past for EtOH intoxication/withdrawal [facility] intoxicated [facility] [employer]. Found to be hypothermic to 80s, BAL>500. Pt with progressive AMS. Pt unable to effectively protect airway and was intubated in ED on 11/23. extubated on 11/27.
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
## 2020-11-30 NOTE — DISCHARGE NOTE NURSING/CASE MANAGEMENT/SOCIAL WORK - NSDCFUADDAPPT_GEN_ALL_CORE_FT
[facility]  [address] [place] [address]    Follow up Scheduled with Dr. Mallory on 12/9/20 at 11:00am Critical access hospital  [address] [place] [address]    Follow up Scheduled with Dr. Mallory on 12/9/20 at 11:00am    [facility] ([facility])  [address] [place] [address] ([facility])  Program runs 12/1-3/31 9pm (alex) - 5am

## 2020-11-30 NOTE — DISCHARGE NOTE NURSING/CASE MANAGEMENT/SOCIAL WORK - NSDPDISTO_GEN_ALL_CORE
September 27, 2017    Sonny Vivar  [address] [facility] [address]        Dear Sonny,       We recently received a refill request for Potassium Chloride ER 20 MEQ TBCR.  We have refilled this for a one time 30 day supply only because you are due for a:    Follow up office visit with Dr. Balderas      Please call at your earliest convenience so that there will not be a delay with your future refills.          Thank you,   Your [facility] Team/[facility]  [phone]                 Alternate Care Site

## 2020-11-30 NOTE — DISCHARGE NOTE PROVIDER - NSDCMRMEDTOKEN_GEN_ALL_CORE_FT
amoxicillin-clavulanate 875 mg-125 mg oral tablet: 1 tab(s) orally 2 times a day  folic acid 1 mg oral tablet: 1 tab(s) orally once a day  lidocaine 4% topical film: Apply topically to affected area once a day   Multiple Vitamins oral tablet: 1 tab(s) orally once a day  thiamine 100 mg oral tablet: 1 tab(s) orally once a day
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
## 2020-11-30 NOTE — PROGRESS NOTE ADULT - NUTRITIONAL ASSESSMENT
This patient has been assessed with a concern for Malnutrition and has been determined to have a diagnosis/diagnoses of Moderate protein-calorie malnutrition.    This patient is being managed with:   Diet Regular-  Entered: Nov 28 2020  7:35AM    
This patient has been assessed with a concern for Malnutrition and has been determined to have a diagnosis/diagnoses of Moderate protein-calorie malnutrition.      
This patient has been assessed with a concern for Malnutrition and has been determined to have a diagnosis/diagnoses of Moderate protein-calorie malnutrition.      
This patient has been assessed with a concern for Malnutrition and has been determined to have a diagnosis/diagnoses of Moderate protein-calorie malnutrition.    This patient is being managed with:   Diet NPO-  Except Medications     Special Instructions for Nursing:  Except Medications  Entered: Nov 23 2020  8:04PM    
This patient has been assessed with a concern for Malnutrition and has been determined to have a diagnosis/diagnoses of Moderate protein-calorie malnutrition.    This patient is being managed with:   Diet Regular-  Entered: Nov 28 2020  7:35AM    
This patient has been assessed with a concern for Malnutrition and has been determined to have a diagnosis/diagnoses of Moderate protein-calorie malnutrition.    This patient is being managed with:   Diet Regular-  Entered: Nov 28 2020  7:35AM

## 2020-11-30 NOTE — PROGRESS NOTE ADULT - SUBJECTIVE AND OBJECTIVE BOX
Date of service: 11-30-20 @ 14:55      Patient lying in bed; has been extubated; afebrile, no coughing      ROS: no fever or chills; denies dizziness, no HA, no SOB or cough, no abdominal pain, no diarrhea or constipation; no dysuria, no urinary frequency, no legs pain, no rashes    MEDICATIONS  (STANDING):  amoxicillin  875 milliGRAM(s)/clavulanate 1 Tablet(s) Oral two times a day  chlordiazePOXIDE   Oral   chlordiazePOXIDE 50 milliGRAM(s) Oral every 12 hours  chlorhexidine 2% Cloths 1 Application(s) Topical <User Schedule>  enoxaparin Injectable 40 milliGRAM(s) SubCutaneous daily  folic acid 1 milliGRAM(s) Oral daily  lidocaine   Patch 1 Patch Transdermal daily  multivitamin 1 Tablet(s) Oral daily    MEDICATIONS  (PRN):  acetaminophen    Suspension .. 650 milliGRAM(s) Oral every 6 hours PRN Temp greater or equal to 38C (100.4F)  LORazepam   Injectable 2 milliGRAM(s) IV Push every 6 hours PRN CIWA > 8      Vital Signs Last 24 Hrs  T(C): 36.9 (30 Nov 2020 04:50), Max: 36.9 (30 Nov 2020 04:50)  T(F): 98.4 (30 Nov 2020 04:50), Max: 98.4 (30 Nov 2020 04:50)  HR: 98 (30 Nov 2020 09:00) (92 - 98)  BP: 130/80 (30 Nov 2020 09:00) (112/68 - 143/97)  BP(mean): 90 (30 Nov 2020 09:00) (87 - 108)  RR: --  SpO2: 95% (30 Nov 2020 14:10) (94% - 95%)        Physical Exam:        Constitutional: frail looking  HEENT: NC/AT  Neck: supple; thyroid not palpable  Back: no tenderness  Respiratory: respiratory effort normal; clear to auscultation  Cardiovascular: S1S2 regular, no murmurs  Abdomen: soft, not tender, not distended, positive BS; no liver or spleen organomegaly  Genitourinary: no suprapubic tenderness  Musculoskeletal: no muscle tenderness, no joint swelling or tenderness  Neurological/ Psychiatric:  awake, alert  Skin: no rashes; no palpable lesions; hyperkeratosis of soles of feet    Labs: all available labs reviewed              Labs:                        11.5   5.85  )-----------( 162      ( 29 Nov 2020 06:15 )             35.1     11-29    142  |  103  |  6<L>  ----------------------------<  86  3.4<L>   |  34<H>  |  0.75    Ca    8.9      29 Nov 2020 06:15  Mg     2.1     11-29             Cultures:       Culture - Sputum (collected 11-26-20 @ 05:45)  Source: .Sputum Sputum  Gram Stain (11-26-20 @ 21:18):    Numerous polymorphonuclear leukocytes per low power field    Few Squamous epithelial cells per low power field    Numerous Gram Negative Rods per oil power field    Rare Gram positive cocci in pairs per oil power field  Final Report (11-28-20 @ 20:32):    Normal Respiratory Jerilyn present    Culture - Urine (collected 11-23-20 @ 18:56)  Source: .Urine None  Final Report (11-24-20 @ 21:26):    No growth    Culture - Blood (collected 11-23-20 @ 18:26)  Source: .Blood Blood-Peripheral  Final Report (11-29-20 @ 02:00):    No Growth Final    Culture - Blood (collected 11-23-20 @ 18:26)  Source: .Blood Blood-Peripheral  Final Report (11-29-20 @ 02:00):    No Growth Final            < from: Xray Chest 1 View- PORTABLE-Urgent (Xray Chest 1 View- PORTABLE-Urgent .) (11.26.20 @ 09:40) >  MPRESSION:  Dense infiltrate left base..    < end of copied text >        Radiology: all available radiological tests reviewed    Advanced directives addressed: full resuscitation

## 2020-11-30 NOTE — DISCHARGE NOTE NURSING/CASE MANAGEMENT/SOCIAL WORK - NSTOBACCONEVERSMOKERY/N_GEN_A
She has had a total knee replacement on the left leg recuperating ambulating with a cane he looks good otherwise no swelling or calf tenderness Yes

## 2020-11-30 NOTE — PROGRESS NOTE ADULT - ASSESSMENT
40M with PMHx of EtOH abuse with admission in past for EtOH intoxication/withdrawal admitted on 11/23 for evaluation of hypothermia after being found on ground behind a store. Patient emergently intubated upon admission and blood alcohol was greater than 500. History per medical record.     1. Patient admitted with respiratory failure most likely due to aspiration pneumonia; also noted with leukocytosis most likely reactive to infection  - patient to be discharge home on augmentin today per hospitalist service  2. other issues: per medicine

## 2020-11-30 NOTE — DISCHARGE NOTE PROVIDER - NSDCCPCAREPLAN_GEN_ALL_CORE_FT
PRINCIPAL DISCHARGE DIAGNOSIS  Diagnosis: Hypothermia due to exposure  Assessment and Plan of Treatment:       SECONDARY DISCHARGE DIAGNOSES  Diagnosis: Alcohol intoxication  Assessment and Plan of Treatment:     Diagnosis: Lactic acidosis  Assessment and Plan of Treatment:     Diagnosis: Hypercarbia  Assessment and Plan of Treatment:

## 2020-11-30 NOTE — DISCHARGE NOTE NURSING/CASE MANAGEMENT/SOCIAL WORK - PATIENT PORTAL LINK FT
You can access the FollowMyHealth Patient Portal offered by [facility] by registering at the following website: [email or web address]. By joining [facility]’s FollowMyHealth portal, you will also be able to view your health information using other applications (apps) compatible with our system.

## 2020-12-01 VITALS — TEMPERATURE: 98 F | HEART RATE: 89 BPM | SYSTOLIC BLOOD PRESSURE: 117 MMHG | DIASTOLIC BLOOD PRESSURE: 75 MMHG

## 2020-12-01 RX ORDER — SODIUM CHLORIDE 9 MG/ML
1000 INJECTION INTRAMUSCULAR; INTRAVENOUS; SUBCUTANEOUS ONCE
Refills: 0 | Status: COMPLETED | OUTPATIENT
Start: 2020-12-01 | End: 2020-12-01

## 2020-12-01 RX ADMIN — ENOXAPARIN SODIUM 40 MILLIGRAM(S): 100 INJECTION SUBCUTANEOUS at 10:31

## 2020-12-01 RX ADMIN — Medication 50 MILLIGRAM(S): at 05:29

## 2020-12-01 RX ADMIN — SODIUM CHLORIDE 1000 MILLILITER(S): 9 INJECTION INTRAMUSCULAR; INTRAVENOUS; SUBCUTANEOUS at 11:25

## 2020-12-01 RX ADMIN — Medication 1 TABLET(S): at 05:28

## 2020-12-01 RX ADMIN — LIDOCAINE 1 PATCH: 4 CREAM TOPICAL at 10:30

## 2020-12-01 RX ADMIN — CHLORHEXIDINE GLUCONATE 1 APPLICATION(S): 213 SOLUTION TOPICAL at 05:27

## 2020-12-01 RX ADMIN — Medication 1 TABLET(S): at 10:30

## 2020-12-01 RX ADMIN — Medication 1 TABLET(S): at 17:05

## 2020-12-01 RX ADMIN — Medication 1 MILLIGRAM(S): at 10:30
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
## 2020-12-04 DIAGNOSIS — T68.XXXA HYPOTHERMIA, INITIAL ENCOUNTER: ICD-10-CM
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
## 2020-12-04 DIAGNOSIS — S00.31XA ABRASION OF NOSE, INITIAL ENCOUNTER: ICD-10-CM

## 2020-12-04 DIAGNOSIS — E51.9 THIAMINE DEFICIENCY, UNSPECIFIED: ICD-10-CM

## 2020-12-04 DIAGNOSIS — F10.231 ALCOHOL DEPENDENCE WITH WITHDRAWAL DELIRIUM: ICD-10-CM

## 2020-12-04 DIAGNOSIS — E83.42 HYPOMAGNESEMIA: ICD-10-CM

## 2020-12-04 DIAGNOSIS — E87.2 ACIDOSIS: ICD-10-CM

## 2020-12-04 DIAGNOSIS — M25.462 EFFUSION, LEFT KNEE: ICD-10-CM
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
## 2020-12-04 DIAGNOSIS — E44.0 MODERATE PROTEIN-CALORIE MALNUTRITION: ICD-10-CM

## 2020-12-04 DIAGNOSIS — E87.6 HYPOKALEMIA: ICD-10-CM

## 2020-12-04 DIAGNOSIS — F10.220 ALCOHOL DEPENDENCE WITH INTOXICATION, UNCOMPLICATED: ICD-10-CM

## 2020-12-04 DIAGNOSIS — J69.0 PNEUMONITIS DUE TO INHALATION OF FOOD AND VOMIT: ICD-10-CM
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
## 2020-12-04 DIAGNOSIS — J96.02 ACUTE RESPIRATORY FAILURE WITH HYPERCAPNIA: ICD-10-CM

## 2020-12-04 DIAGNOSIS — E87.0 HYPEROSMOLALITY AND HYPERNATREMIA: ICD-10-CM

## 2021-03-26 NOTE — PROGRESS NOTE ADULT - GASTROINTESTINAL
detailed exam Cyclosporine Counseling:  I discussed with the patient the risks of cyclosporine including but not limited to hypertension, gingival hyperplasia,myelosuppression, immunosuppression, liver damage, kidney damage, neurotoxicity, lymphoma, and serious infections. The patient understands that monitoring is required including baseline blood pressure, CBC, CMP, lipid panel and uric acid, and then 1-2 times monthly CMP and blood pressure.

## 2021-07-14 NOTE — ED ADULT NURSE NOTE - SUICIDE SCREENING QUESTION 1
Render Risk Assessment In Note?: no
Note Text (......Xxx Chief Complaint.): This diagnosis correlates with the
Detail Level: Zone
Other (Free Text): [employer] discussed and handout given
Patient unable to complete

## 2024-02-16 NOTE — DISCHARGE NOTE PROVIDER - CARE PROVIDER_API CALL
Nehemiah Mallory  INTERNAL MEDICINE  [address] [place] [address]  Phone: [phone]  Fax: [phone]  Follow Up Time:    Respiratory

## 2024-03-20 NOTE — CONSULT NOTE ADULT - OTHER
Allegra 180 mg tablet  Take 2 tablets in the AM   AND   Take 2 tablet in the PM   
[address], [place]

## 2025-05-30 NOTE — PROGRESS NOTE ADULT - SUBJECTIVE AND OBJECTIVE BOX
Patient is a 58y old  Male who presents with a chief complaint of Hypothermia, shock (2020 08:20)      SUBJECTIVE:   HPI:  HHCRITICAL, LIGIA is a ~59 yo [place] speaking male endorses his name is Dashawn Wills, doesn't know ) with presumed hx of ETOH abuse found outside/down on ground and found to be acutely intoxicated with e/o hypothermia and hypotension. Unclear how long patient was down for. Admitted to ICU for etoh withdrawal and completed course of precedex.     Subjuctive:  - pt awake, doing well, no c/o, no o/n events. d/w IDR- awaiting placement   - pt awake, no new c/o or o/n events, awaiting placement.        PHYSICAL EXAM:  Vital Signs Last 24 Hrs  T(C): 36.8 (2020 07:53), Max: 37 (2020 23:42)  T(F): 98.2 (2020 07:53), Max: 98.6 (2020 23:42)  HR: 56 (2020 07:53) (56 - 97)  BP: 118/68 (2020 07:53) (99/60 - 118/68)  BP(mean): --  RR: 18 (2020 07:53) (18 - 18)  SpO2: 95% (2020 07:53) (95% - 100%)  Constitutional: NAD, awake and alert, well-developed  HEENT: PERR, EOMI, Normal Hearing, MMM  Neck: Soft and supple, No LAD, No JVD  Respiratory: Breath sounds are clear bilaterally, No wheezing, rales or rhonchi, resp unlabored  Cardiovascular: S1 and S2, regular rate and rhythm, no Murmurs, gallops or rubs  Gastrointestinal: Bowel Sounds present, soft, nontender, nondistended, no guarding, no rebound  Extremities: No peripheral edema  Vascular: 2+ peripheral pulses  Neurological: A/O x 3, no focal deficits  Musculoskeletal: 5/5 strength b/l upper and lower extremities  Skin: No visible rashes    MEDICATIONS:  MEDICATIONS  (STANDING):  chlorhexidine 4% Liquid 1 Application(s) Topical <User Schedule>  enoxaparin Injectable 40 milliGRAM(s) SubCutaneous daily  folic acid 1 milliGRAM(s) Oral daily  LORazepam   Injectable 2 milliGRAM(s) IV Push every 6 hours  multivitamin 1 Tablet(s) Oral daily  thiamine 100 milliGRAM(s) Oral daily      LABS: All Labs Reviewed:  no new labs.    Culture Results:   No Growth Final ( @ 08:34)  Culture Results:   No Growth Final ( @ 08:34)   no